# Patient Record
Sex: MALE | Race: WHITE | NOT HISPANIC OR LATINO | Employment: OTHER | URBAN - METROPOLITAN AREA
[De-identification: names, ages, dates, MRNs, and addresses within clinical notes are randomized per-mention and may not be internally consistent; named-entity substitution may affect disease eponyms.]

---

## 2023-08-09 ENCOUNTER — TELEPHONE (OUTPATIENT)
Dept: UROLOGY | Facility: AMBULATORY SURGERY CENTER | Age: 73
End: 2023-08-09

## 2023-08-09 NOTE — TELEPHONE ENCOUNTER
New Patient    What is the reason for the patient’s appointment?:  BPH w/ urinary frequency     What office location does the patient prefer?: [facility]     Have patient records been requested?:  If No, are the records showing in Epic:     PCP will be faxing in records for pt  Fax number was given       INSURANCE:   Do we accept the patient's insurance or is the patient Self-Pay?: yes    Insurance Provider: Godwin Coe [facility] PPO  Plan Type/Number: [id number]  Member ID#: [id number]      HISTORY:   Has the patient had any previous Urologist(s)?: no     Was the patient seen in the ED?: no     Has the patient had any outside testing done?: no     Does the patient have a personal history of cancer?: no

## 2023-08-12 ENCOUNTER — OFFICE VISIT (OUTPATIENT)
Dept: URGENT CARE | Facility: MEDICAL CENTER | Age: 73
End: 2023-08-12
Payer: COMMERCIAL

## 2023-08-12 ENCOUNTER — HOSPITAL ENCOUNTER (EMERGENCY)
Facility: HOSPITAL | Age: 73
Discharge: HOME | End: 2023-08-12
Attending: EMERGENCY MEDICINE
Payer: COMMERCIAL

## 2023-08-12 VITALS
HEART RATE: 98 BPM | OXYGEN SATURATION: 96 % | TEMPERATURE: 98.3 F | DIASTOLIC BLOOD PRESSURE: 91 MMHG | RESPIRATION RATE: 18 BRPM | SYSTOLIC BLOOD PRESSURE: 175 MMHG

## 2023-08-12 VITALS
HEIGHT: 71 IN | TEMPERATURE: 98.6 F | OXYGEN SATURATION: 96 % | BODY MASS INDEX: 25.2 KG/M2 | WEIGHT: 180 LBS | SYSTOLIC BLOOD PRESSURE: 140 MMHG | HEART RATE: 111 BPM | RESPIRATION RATE: 20 BRPM | DIASTOLIC BLOOD PRESSURE: 80 MMHG

## 2023-08-12 DIAGNOSIS — R00.0 TACHYCARDIA: Primary | ICD-10-CM

## 2023-08-12 DIAGNOSIS — R19.5 ABNORMAL STOOL COLOR: ICD-10-CM

## 2023-08-12 DIAGNOSIS — H61.23 BILATERAL IMPACTED CERUMEN: ICD-10-CM

## 2023-08-12 DIAGNOSIS — Z53.29 LEFT AGAINST MEDICAL ADVICE: ICD-10-CM

## 2023-08-12 LAB
ALBUMIN SERPL BCP-MCNC: 4.1 G/DL (ref 3.5–5)
ALP SERPL-CCNC: 23 U/L (ref 34–104)
ALT SERPL W P-5'-P-CCNC: 23 U/L (ref 7–52)
ANION GAP SERPL CALCULATED.3IONS-SCNC: 8 MMOL/L
AST SERPL W P-5'-P-CCNC: 15 U/L (ref 13–39)
ATRIAL RATE: 98 BPM
BASOPHILS # BLD AUTO: 0.04 THOUSANDS/ÂΜL (ref 0–0.1)
BASOPHILS NFR BLD AUTO: 0 % (ref 0–1)
BILIRUB SERPL-MCNC: 0.55 MG/DL (ref 0.2–1)
BUN SERPL-MCNC: 16 MG/DL (ref 5–25)
CALCIUM SERPL-MCNC: 8.9 MG/DL (ref 8.4–10.2)
CARDIAC TROPONIN I PNL SERPL HS: 7 NG/L
CHLORIDE SERPL-SCNC: 106 MMOL/L (ref 96–108)
CO2 SERPL-SCNC: 24 MMOL/L (ref 21–32)
CREAT SERPL-MCNC: 0.88 MG/DL (ref 0.6–1.3)
EOSINOPHIL # BLD AUTO: 0.26 THOUSAND/ÂΜL (ref 0–0.61)
EOSINOPHIL NFR BLD AUTO: 3 % (ref 0–6)
ERYTHROCYTE [DISTWIDTH] IN BLOOD BY AUTOMATED COUNT: 12.8 % (ref 11.6–15.1)
GFR SERPL CREATININE-BSD FRML MDRD: 87 ML/MIN/1.73SQ M
GLUCOSE SERPL-MCNC: 94 MG/DL (ref 65–140)
HCT VFR BLD AUTO: 47.7 % (ref 36.5–49.3)
HGB BLD-MCNC: 16.2 G/DL (ref 12–17)
IMM GRANULOCYTES # BLD AUTO: 0.04 THOUSAND/UL (ref 0–0.2)
IMM GRANULOCYTES NFR BLD AUTO: 0 % (ref 0–2)
LIPASE SERPL-CCNC: 33 U/L (ref 11–82)
LYMPHOCYTES # BLD AUTO: 1.77 THOUSANDS/ÂΜL (ref 0.6–4.47)
LYMPHOCYTES NFR BLD AUTO: 17 % (ref 14–44)
MAGNESIUM SERPL-MCNC: 1.8 MG/DL (ref 1.9–2.7)
MCH RBC QN AUTO: 31.2 PG (ref 26.8–34.3)
MCHC RBC AUTO-ENTMCNC: 34 G/DL (ref 31.4–37.4)
MCV RBC AUTO: 92 FL (ref 82–98)
MONOCYTES # BLD AUTO: 0.88 THOUSAND/ÂΜL (ref 0.17–1.22)
MONOCYTES NFR BLD AUTO: 8 % (ref 4–12)
NEUTROPHILS # BLD AUTO: 7.6 THOUSANDS/ÂΜL (ref 1.85–7.62)
NEUTS SEG NFR BLD AUTO: 72 % (ref 43–75)
NRBC BLD AUTO-RTO: 0 /100 WBCS
P AXIS: 76 DEGREES
PLATELET # BLD AUTO: 227 THOUSANDS/UL (ref 149–390)
PMV BLD AUTO: 11.1 FL (ref 8.9–12.7)
POTASSIUM SERPL-SCNC: 3.4 MMOL/L (ref 3.5–5.3)
PR INTERVAL: 144 MS
PROT SERPL-MCNC: 6 G/DL (ref 6.4–8.4)
QRS AXIS: 2 DEGREES
QRSD INTERVAL: 88 MS
QT INTERVAL: 360 MS
QTC INTERVAL: 459 MS
RBC # BLD AUTO: 5.2 MILLION/UL (ref 3.88–5.62)
SODIUM SERPL-SCNC: 138 MMOL/L (ref 135–147)
T WAVE AXIS: 40 DEGREES
TSH SERPL DL<=0.05 MIU/L-ACNC: 0.62 UIU/ML (ref 0.45–4.5)
VENTRICULAR RATE: 98 BPM
WBC # BLD AUTO: 10.59 THOUSAND/UL (ref 4.31–10.16)

## 2023-08-12 PROCEDURE — 99284 EMERGENCY DEPT VISIT MOD MDM: CPT

## 2023-08-12 PROCEDURE — 84484 ASSAY OF TROPONIN QUANT: CPT | Performed by: PHYSICIAN ASSISTANT

## 2023-08-12 PROCEDURE — 93005 ELECTROCARDIOGRAM TRACING: CPT | Performed by: PHYSICIAN ASSISTANT

## 2023-08-12 PROCEDURE — 83690 ASSAY OF LIPASE: CPT | Performed by: PHYSICIAN ASSISTANT

## 2023-08-12 PROCEDURE — S9083 URGENT CARE CENTER GLOBAL: HCPCS | Performed by: PHYSICIAN ASSISTANT

## 2023-08-12 PROCEDURE — 99285 EMERGENCY DEPT VISIT HI MDM: CPT | Performed by: PHYSICIAN ASSISTANT

## 2023-08-12 PROCEDURE — 99204 OFFICE O/P NEW MOD 45 MIN: CPT | Performed by: PHYSICIAN ASSISTANT

## 2023-08-12 PROCEDURE — 80053 COMPREHEN METABOLIC PANEL: CPT | Performed by: PHYSICIAN ASSISTANT

## 2023-08-12 PROCEDURE — 83735 ASSAY OF MAGNESIUM: CPT | Performed by: PHYSICIAN ASSISTANT

## 2023-08-12 PROCEDURE — 84443 ASSAY THYROID STIM HORMONE: CPT | Performed by: PHYSICIAN ASSISTANT

## 2023-08-12 PROCEDURE — 93005 ELECTROCARDIOGRAM TRACING: CPT

## 2023-08-12 PROCEDURE — 85025 COMPLETE CBC W/AUTO DIFF WBC: CPT | Performed by: PHYSICIAN ASSISTANT

## 2023-08-12 PROCEDURE — 36415 COLL VENOUS BLD VENIPUNCTURE: CPT | Performed by: PHYSICIAN ASSISTANT

## 2023-08-12 RX ORDER — AMLODIPINE BESYLATE 10 MG/1
1 TABLET ORAL EVERY MORNING
COMMUNITY
Start: 2023-04-23

## 2023-08-12 RX ORDER — FLUOXETINE HYDROCHLORIDE 40 MG/1
40 CAPSULE ORAL DAILY
COMMUNITY
Start: 2023-08-01

## 2023-08-12 RX ORDER — HYDROXYZINE HYDROCHLORIDE 25 MG/1
0.5 TABLET, FILM COATED ORAL 2 TIMES DAILY PRN
COMMUNITY
Start: 2023-07-07

## 2023-08-12 RX ORDER — METFORMIN HYDROCHLORIDE 500 MG/1
1 TABLET, EXTENDED RELEASE ORAL EVERY MORNING
COMMUNITY
Start: 2023-04-24

## 2023-08-12 RX ORDER — ATORVASTATIN CALCIUM 40 MG/1
1 TABLET, FILM COATED ORAL EVERY MORNING
COMMUNITY
Start: 2023-04-23

## 2023-08-12 RX ORDER — SODIUM CHLORIDE, SODIUM GLUCONATE, SODIUM ACETATE, POTASSIUM CHLORIDE, MAGNESIUM CHLORIDE, SODIUM PHOSPHATE, DIBASIC, AND POTASSIUM PHOSPHATE .53; .5; .37; .037; .03; .012; .00082 G/100ML; G/100ML; G/100ML; G/100ML; G/100ML; G/100ML; G/100ML
1000 INJECTION, SOLUTION INTRAVENOUS ONCE
Status: DISCONTINUED | OUTPATIENT
Start: 2023-08-12 | End: 2023-08-12 | Stop reason: HOSPADM

## 2023-08-12 RX ORDER — LOSARTAN POTASSIUM 50 MG/1
TABLET ORAL
COMMUNITY
Start: 2023-07-07

## 2023-08-12 RX ORDER — FENOFIBRATE 145 MG/1
1 TABLET, COATED ORAL EVERY MORNING
COMMUNITY
Start: 2023-04-23

## 2023-08-12 RX ORDER — TAMSULOSIN HYDROCHLORIDE 0.4 MG/1
1 CAPSULE ORAL EVERY MORNING
COMMUNITY
Start: 2023-07-15

## 2023-08-12 NOTE — ED PROVIDER NOTES
History  Chief Complaint   Patient presents with   • Rapid Heart Rate     Pt sent from urgent care for tachycardia, patient is not experiencing any symptoms at this time. 81year old male with PMH HTN, HLD, DM, depression/anxiety, BPH presents ambulatory for further evaluation. Pt states he went to urgent care today to "get his poop checked out". He states yesterday he had a loose stool and it appeared darker in color than usual.  He notes it was a dark brown. Denies black or bloody stools. He notes today he had a solid stool and was dark brown in color. He denies any abdominal pain. Denies N/V/D/C. He reports a good appetite. He notes he had some "gurgling" in his stomach the other day but that has improved. Denies fever, chills. Denies chest pain, SOB. He states he feels fine and is not sure why urgent care sent him here in the first place. He does not describe palpitations. He does not feel like there is a cardiac concern. Denies dizziness, lightheadedness. No syncope. No reported recent falls. Denies numbness, tingling, weakness. Notes nocturia secondary to BPH but otherwise denies any urinary complaints. Denies recent travel. Denies sick contacts. Pt states the urgent care did an EKG and sent him here. He feels this was unnecessary and does not feel he needs to be in the emergency room at this time. History provided by:  Patient and medical records   used: No    Rapid Heart Rate  Associated symptoms: no back pain, no chest pain, no cough, no dizziness, no nausea, no near-syncope, no numbness, no shortness of breath, no syncope, no vomiting and no weakness    Risk factors: no heart disease        Prior to Admission Medications   Prescriptions Last Dose Informant Patient Reported? Taking?    Aspirin Buf,CaCarb-MgCarb-MgO, 81 MG TABS   Yes No   Sig: Take 1 tablet by mouth daily   FLUoxetine (PROzac) 40 MG capsule   Yes No   Sig: Take 40 mg by mouth daily amLODIPine (NORVASC) 10 mg tablet   Yes No   Si tablet every morning   atorvastatin (LIPITOR) 40 mg tablet   Yes No   Si tablet every morning   fenofibrate (TRICOR) 145 mg tablet   Yes No   Si tablet every morning   hydrOXYzine HCL (ATARAX) 25 mg tablet   Yes No   Sig: Take 0.5 tablets by mouth 2 (two) times a day as needed   losartan (COZAAR) 50 mg tablet   Yes No   Sig: Take 1 and 1/2 (one and one-half) tablet daily. metFORMIN (GLUCOPHAGE-XR) 500 mg 24 hr tablet   Yes No   Si tablet every morning   tamsulosin (FLOMAX) 0.4 mg   Yes No   Si capsule every morning      Facility-Administered Medications: None       Past Medical History:   Diagnosis Date   • Depression    • Hypertension        History reviewed. No pertinent surgical history. History reviewed. No pertinent family history. I have reviewed and agree with the history as documented. E-Cigarette/Vaping   • E-Cigarette Use Never User      E-Cigarette/Vaping Substances     Social History     Tobacco Use   • Smoking status: Every Day     Types: Cigarettes   • Smokeless tobacco: Never   Vaping Use   • Vaping Use: Never used   Substance Use Topics   • Alcohol use: Not Currently   • Drug use: Not Currently       Review of Systems   Constitutional: Negative. Negative for chills, fatigue and fever. HENT: Negative. Negative for congestion, rhinorrhea and sore throat. Eyes: Negative. Negative for visual disturbance. Respiratory: Negative. Negative for cough, shortness of breath and wheezing. Cardiovascular: Negative for chest pain, palpitations, leg swelling, syncope and near-syncope. Gastrointestinal: Negative. Negative for abdominal pain, blood in stool, constipation, diarrhea, nausea and vomiting. Genitourinary: Negative. Negative for dysuria, flank pain, frequency and hematuria. Musculoskeletal: Negative. Negative for back pain, myalgias and neck pain. Skin: Negative. Negative for rash.    Neurological: Negative. Negative for dizziness, weakness, light-headedness, numbness and headaches. Psychiatric/Behavioral: Negative. All other systems reviewed and are negative. Physical Exam  Physical Exam  Vitals and nursing note reviewed. Constitutional:       General: He is awake. He is not in acute distress. Appearance: Normal appearance. He is well-developed and overweight. He is not toxic-appearing or diaphoretic. HENT:      Head: Normocephalic and atraumatic. Right Ear: Hearing and external ear normal.      Left Ear: Hearing and external ear normal.      Nose: Nose normal.      Mouth/Throat:      Mouth: Mucous membranes are moist.      Tongue: Tongue does not deviate from midline. Pharynx: Oropharynx is clear. Uvula midline. Eyes:      General: Lids are normal. No scleral icterus. Conjunctiva/sclera: Conjunctivae normal.      Pupils: Pupils are equal, round, and reactive to light. Neck:      Trachea: Trachea and phonation normal.   Cardiovascular:      Rate and Rhythm: Normal rate and regular rhythm. Extrasystoles are present. Pulses: Normal pulses. Radial pulses are 2+ on the right side and 2+ on the left side. Dorsalis pedis pulses are 2+ on the right side and 2+ on the left side. Posterior tibial pulses are 2+ on the right side and 2+ on the left side. Heart sounds: Normal heart sounds, S1 normal and S2 normal. No murmur heard. Pulmonary:      Effort: Pulmonary effort is normal. No tachypnea or respiratory distress. Breath sounds: Normal breath sounds. No wheezing, rhonchi or rales. Abdominal:      General: Bowel sounds are normal. There is no distension. Palpations: Abdomen is soft. Tenderness: There is no abdominal tenderness. There is no right CVA tenderness, left CVA tenderness, guarding or rebound. Musculoskeletal:      Cervical back: Normal range of motion and neck supple. Right lower leg: No edema.       Left lower leg: No edema. Skin:     General: Skin is warm and dry. Capillary Refill: Capillary refill takes less than 2 seconds. Findings: No rash. Comments: Pt has a mass/growth in supraclavicular region on the right. Neurological:      General: No focal deficit present. Mental Status: He is alert and oriented to person, place, and time. GCS: GCS eye subscore is 4. GCS verbal subscore is 5. GCS motor subscore is 6. Cranial Nerves: Cranial nerves 2-12 are intact. Sensory: Sensation is intact. Motor: Motor function is intact. Gait: Gait normal.      Comments: Pt ambulatory to exam room, steady gait. Psychiatric:         Mood and Affect: Mood is anxious. Speech: Speech normal.         Behavior: Behavior normal. Behavior is cooperative.          Vital Signs  ED Triage Vitals [08/12/23 0949]   Temperature Pulse Respirations Blood Pressure SpO2   98.3 °F (36.8 °C) 98 18 (!) 175/91 96 %      Temp Source Heart Rate Source Patient Position - Orthostatic VS BP Location FiO2 (%)   Temporal Monitor Lying Left arm --      Pain Score       No Pain           Vitals:    08/12/23 0949   BP: (!) 175/91   Pulse: 98   Patient Position - Orthostatic VS: Lying         Visual Acuity      ED Medications  Medications   multi-electrolyte (ISOLYTE-S PH 7.4) bolus 1,000 mL (has no administration in time range)       Diagnostic Studies  Results Reviewed     Procedure Component Value Units Date/Time    HS Troponin 0hr (reflex protocol) [[id number]]  (Normal) Collected: 08/12/23 1015    Lab Status: Final result Specimen: Blood from Arm, Right Updated: 08/12/23 1044     hs TnI 0hr 7 ng/L     HS Troponin I 2hr [[id number]]     Lab Status: No result Specimen: Blood     Comprehensive metabolic panel [[id number]]  (Abnormal) Collected: 08/12/23 1015    Lab Status: Final result Specimen: Blood from Arm, Right Updated: 08/12/23 1039     Sodium 138 mmol/L      Potassium 3.4 mmol/L      Chloride 106 mmol/L CO2 24 mmol/L      ANION GAP 8 mmol/L      BUN 16 mg/dL      Creatinine 0.88 mg/dL      Glucose 94 mg/dL      Calcium 8.9 mg/dL      AST 15 U/L      ALT 23 U/L      Alkaline Phosphatase 23 U/L      Total Protein 6.0 g/dL      Albumin 4.1 g/dL      Total Bilirubin 0.55 mg/dL      eGFR 87 ml/min/1.73sq m     Narrative:      National Kidney Disease Foundation guidelines for Chronic Kidney Disease (CKD):   •  Stage 1 with normal or high GFR (GFR > 90 mL/min/1.73 square meters)  •  Stage 2 Mild CKD (GFR = 60-89 mL/min/1.73 square meters)  •  Stage 3A Moderate CKD (GFR = 45-59 mL/min/1.73 square meters)  •  Stage 3B Moderate CKD (GFR = 30-44 mL/min/1.73 square meters)  •  Stage 4 Severe CKD (GFR = 15-29 mL/min/1.73 square meters)  •  Stage 5 End Stage CKD (GFR <15 mL/min/1.73 square meters)  Note: GFR calculation is accurate only with a steady state creatinine    Lipase [[id number]]  (Normal) Collected: 08/12/23 1015    Lab Status: Final result Specimen: Blood from Arm, Right Updated: 08/12/23 1039     Lipase 33 u/L     Magnesium [[id number]]  (Abnormal) Collected: 08/12/23 1015    Lab Status: Final result Specimen: Blood from Arm, Right Updated: 08/12/23 1039     Magnesium 1.8 mg/dL     CBC and differential [[id number]]  (Abnormal) Collected: 08/12/23 1015    Lab Status: Final result Specimen: Blood from Arm, Right Updated: 08/12/23 1022     WBC 10.59 Thousand/uL      RBC 5.20 Million/uL      Hemoglobin 16.2 g/dL      Hematocrit 47.7 %      MCV 92 fL      MCH 31.2 pg      MCHC 34.0 g/dL      RDW 12.8 %      MPV 11.1 fL      Platelets 681 Thousands/uL      nRBC 0 /100 WBCs      Neutrophils Relative 72 %      Immat GRANS % 0 %      Lymphocytes Relative 17 %      Monocytes Relative 8 %      Eosinophils Relative 3 %      Basophils Relative 0 %      Neutrophils Absolute 7.60 Thousands/µL      Immature Grans Absolute 0.04 Thousand/uL      Lymphocytes Absolute 1.77 Thousands/µL      Monocytes Absolute 0.88 Thousand/µL Eosinophils Absolute 0.26 Thousand/µL      Basophils Absolute 0.04 Thousands/µL     TSH, 3rd generation with Free T4 reflex [[id number]] Collected: 08/12/23 1015    Lab Status: In process Specimen: Blood from Arm, Right Updated: 08/12/23 1019                 No orders to display              Procedures  ECG 12 Lead Documentation Only    Date/Time: 8/12/2023 9:51 AM    Performed by: Cheng Bowen PA-C  Authorized by: Cheng Bowen PA-C    Indications / Diagnosis:  Tachycardia  ECG reviewed by me, the ED Provider: yes    Patient location:  ED  Previous ECG:     Comparison to cardiac monitor: Yes    Interpretation:     Interpretation: non-specific    Rate:     ECG rate:  98    ECG rate assessment: normal    Rhythm:     Rhythm: sinus rhythm    QRS:     QRS axis:  Normal    QRS intervals:  Normal  Conduction:     Conduction: normal    ST segments:     ST segments:  Non-specific  T waves:     T waves: normal    Comments:      , QRS 88, QT/QTc 360/459; no acute ischemic changes, SR with premature supraventricular complexes. ED Course  ED Course as of 08/12/23 1052   Sat Aug 12, 2023   1004 Reviewed urgent care visit from earlier today. Referred to ER due to fluctuating HR.   1024 Pt had recent visit with PCP in the office for routine follow up on 7/7/23 which was reviewed. Had outpatient labs on 7/21/23 which were also reviewed. 1024 WBC(!): 10.59  Minimally elevated, non specific   1025 Hemoglobin: 16.2  Stable, value of 14.9 on outpatient labs 7/21/23 from outside facility. 1025 Platelet Count: 896   8632 Pt indicates he does not want to stay for remainder of the testing. He reports he has other stuff to be doing today. He does not feel he should of been sent here in the first place. He declined ordered fluids. Declined rectal exam.  He indicates under no circumstances would he ever consider being admitted to the hospital.  He would like to be discharged at that time.   Only CBC has resulted at this time. Will have pt sign out against medical advice. He is aware he may return at any time if he reconsiders further testing/evaluation. He reports he will follow up with his doctor on an outpatient basis and go for any needed testing. This patient insists on leaving against medical advice, despite my recommendation to remain for ongoing evaluation and treatment. The patient appears to understand the risks of doing so. He is awake, alert and oriented. He does not appear to be under the influence. He appears capable of making his own medical decisions. We discussed worsening/deterioration of condition, undiagnosed condition, permanent disability, cardiac arrest and death as a potential consequence of leaving at this time. I have discussed the patient's plan for follow-up care and urged an immediate arrangement of follow-up. I have emphasized that the patient can always return to the emergency department at any time for further evaluation and treatment. Pt voiced understanding and had no further questions. Pt left AMA in stable condition. HEART Risk Score    Flowsheet Row Most Recent Value   Heart Score Risk Calculator    History 0 Filed at: 08/12/2023 1027   ECG 0 Filed at: 08/12/2023 1027   Age 2 Filed at: 08/12/2023 1027   Risk Factors 1 Filed at: 08/12/2023 1027   Troponin 0 Filed at: 08/12/2023 1027   HEART Score 3 Filed at: 08/12/2023 1027                        SBIRT 22yo+    Flowsheet Row Most Recent Value   Initial Alcohol Screen: US AUDIT-C     1. How often do you have a drink containing alcohol? 0 Filed at: 08/12/2023 0948   2. How many drinks containing alcohol do you have on a typical day you are drinking? 0 Filed at: 08/12/2023 0948   3a. Male UNDER 65: How often do you have five or more drinks on one occasion? 0 Filed at: 08/12/2023 0948   3b. FEMALE Any Age, or MALE 65+: How often do you have 4 or more drinks on one occassion?  0 Filed at: 08/12/2023 4135 Audit-C Score 0 Filed at: 08/12/2023 9532                    Medical Decision Making  81year old male presenting for evaluation from urgent care. Pt voices no specific complaints. It sounds like he had a change in color and consistency of stool yesterday which concerned him but then normalized today. Will obtain EKG, check labs. Pt has a soft non tender abdomen. He has no chest or respiratory complaints. He is afebrile, well appearing. BP mildly elevated but he contributes to anxiety secondary to being sent here. Work up obtained as noted above. CBC not suggestive of bleeding, stable, improved hemoglobin from recent. Prior to additional testing/evaluation, pt signed out against medical advice. He left in stable condition and was advised to see his PCP in follow up. He is aware he may return at any time for re-evaluation or reconsideration of further testing. I did advise that he should consider further imaging of the growth/mass of his right supraclavicular region but he tells me he's had this for a while and his PCP is aware. He states he will never have surgery. He voiced understanding and had no further questions. Please refer to above ER course for further details/discussion. Tachycardia: acute illness or injury  Amount and/or Complexity of Data Reviewed  External Data Reviewed: labs and notes. Labs: ordered. Decision-making details documented in ED Course. ECG/medicine tests: ordered and independent interpretation performed. Decision-making details documented in ED Course. Risk  Prescription drug management.           Disposition  Final diagnoses:   Tachycardia   Left against medical advice     Time reflects when diagnosis was documented in both MDM as applicable and the Disposition within this note     Time User Action Codes Description Comment    8/12/2023 10:32 AM Delfina Bender Add [R00.0] Tachycardia     8/12/2023 10:52 AM Delfina Kramern Add [Z53.29] Left against medical advice       ED Disposition     ED Disposition   AMA    Condition   --    Date/Time   Sat Aug 12, 2023 10:32 AM    Comment   Date: 8/12/2023  Patient: Alexx Naidu  Admitted: 8/12/2023  9:43 AM  Attending Provider: Ilia Smyth or his authorized caregiver has made the decision for the patient to leave the emergency department against the advice o f his attending physician. He or his authorized caregiver has been informed and understands the inherent risks, including death. He or his authorized caregiver has decided to accept the responsibility for this decision. Alexx Naidu and all necessary  parties have been advised that he may return for further evaluation or treatment. His condition at time of discharge was stable.   Alexx Naidu had current vital signs as follows:  BP (!) 175/91 (BP Location: Left arm)   Pulse 98   Temp 98.3 °F (36. 8 °C) (Temporal)   Resp 18            Follow-up Information     Follow up With Specialties Details Why Contact Info Additional [address] [facility], DO Internal Medicine Schedule an appointment as soon as possible for a visit   [id number] [facility],[address] [facility] Emergency Department Emergency Medicine  As needed [address] [place] [id number]  [address] [facility] Emergency Department, [address] [place] [address], [facility]          Discharge Medication List as of 8/12/2023 10:38 AM      CONTINUE these medications which have NOT CHANGED    Details   amLODIPine (NORVASC) 10 mg tablet 1 tablet every morning, Starting Sun 4/23/2023, Historical Med      Aspirin Buf,CaCarb-MgCarb-MgO, 81 MG TABS Take 1 tablet by mouth daily, Historical Med      atorvastatin (LIPITOR) 40 mg tablet 1 tablet every morning, Starting Sun 4/23/2023, Historical Med      fenofibrate (TRICOR) 145 mg tablet 1 tablet every morning, Starting Sun 4/23/2023, Historical Med FLUoxetine (PROzac) 40 MG capsule Take 40 mg by mouth daily, Starting Tue 8/1/2023, Historical Med      hydrOXYzine HCL (ATARAX) 25 mg tablet Take 0.5 tablets by mouth 2 (two) times a day as needed, Starting Fri 7/7/2023, Historical Med      losartan (COZAAR) 50 mg tablet Take 1 and 1/2 (one and one-half) tablet daily. , Historical Med      metFORMIN (GLUCOPHAGE-XR) 500 mg 24 hr tablet 1 tablet every morning, Starting Mon 4/24/2023, Historical Med      tamsulosin (FLOMAX) 0.4 mg 1 capsule every morning, Starting Sat 7/15/2023, Historical Med             No discharge procedures on file.     PDMP Review     None          ED Provider  Electronically Signed by           Ang De Jesus PA-C  08/12/23 3332

## 2023-08-12 NOTE — PROGRESS NOTES
[facility]        NAME: Darren Alvarado is a 81 y.o. male  : 1950    MRN: [id number]  DATE: 2023  TIME: 9:34 AM    Assessment and Plan   Tachycardia [R00.0]  1. Tachycardia  ECG 12 lead    Transfer to other facility      2. Abnormal stool color        3. Bilateral impacted cerumen            Rx P: 87. Denies hx/o atrial fibrillation. EKbmp. ST depression in lead 2, 3, and aVF. Regularly irregular. Possible Mobitz Type 2. No chest pain, SOB, lightheadedness. Informed patient that we will defer cerumen impaction. Discussed risks of proceeding to ED via personal vehicle including worsening of condition and with harm to self or others. Patient verbalized understanding and refused ambulance. Signed AMA. Patient Instructions       Follow up with PCP in 3-5 days. Proceed to  ER     Chief Complaint     Chief Complaint   Patient presents with   • bowel issues      States that his bowel is a different color, states that it is a very dark brown. Half  solid and half diarrhea yesterday. Today bowel was solid    • Ear Fullness     States left ear is full and sounds like a noise from a fan . Has dr werner on 23 with ent          History of Present Illness       Reports bloating x few days, "gurgling" abdominal noises, dark stool, and "half watery and half solid" x last night. Reports decreased appetite a few days prior to symptoms, which normalized yesterday. Denies abdominal pain, nausea, vomiting. States today his stool was a "dark brown" color but solid. Denies black stool or blood. Patient was not taking pepto bismol. Does not drink "a lot of water" states he drinks soda and coffee. Apt with ENT on . Ear Fullness   There is pain in the left ear. This is a new problem. There has been no fever. The patient is experiencing no pain. Associated symptoms include diarrhea (resolved).  Pertinent negatives include no abdominal pain, coughing, ear discharge, headaches, rash, rhinorrhea, sore throat or vomiting. Associated symptoms comments: Reports "fan noise". He has tried nothing for the symptoms. Review of Systems   Review of Systems   Constitutional: Negative for chills and fever. HENT: Positive for ear pain. Negative for congestion, dental problem, ear discharge, facial swelling, postnasal drip, rhinorrhea, sinus pressure, sinus pain, sneezing, sore throat and trouble swallowing. Eyes: Negative for itching. Respiratory: Negative for cough, chest tightness, shortness of breath and wheezing. Cardiovascular: Positive for palpitations (heart flutter during physical examination). Negative for chest pain. Gastrointestinal: Positive for diarrhea (resolved). Negative for abdominal pain, constipation, nausea and vomiting. Musculoskeletal: Negative for myalgias. Skin: Negative for rash. Neurological: Negative for dizziness, weakness, light-headedness and headaches. Current Medications       Current Outpatient Medications:   •  amLODIPine (NORVASC) 10 mg tablet, 1 tablet every morning, Disp: , Rfl:   •  Aspirin Buf,CaCarb-MgCarb-MgO, 81 MG TABS, Take 1 tablet by mouth daily, Disp: , Rfl:   •  atorvastatin (LIPITOR) 40 mg tablet, 1 tablet every morning, Disp: , Rfl:   •  fenofibrate (TRICOR) 145 mg tablet, 1 tablet every morning, Disp: , Rfl:   •  FLUoxetine (PROzac) 40 MG capsule, Take 40 mg by mouth daily, Disp: , Rfl:   •  hydrOXYzine HCL (ATARAX) 25 mg tablet, Take 0.5 tablets by mouth 2 (two) times a day as needed, Disp: , Rfl:   •  losartan (COZAAR) 50 mg tablet, Take 1 and 1/2 (one and one-half) tablet daily. , Disp: , Rfl:   •  metFORMIN (GLUCOPHAGE-XR) 500 mg 24 hr tablet, 1 tablet every morning, Disp: , Rfl:   •  tamsulosin (FLOMAX) 0.4 mg, 1 capsule every morning, Disp: , Rfl:     Current Allergies     Allergies as of 08/12/2023 - Reviewed 08/12/2023   Allergen Reaction Noted   • Niacin Other (See Comments) 11/26/2019            The following portions of the patient's history were reviewed and updated as appropriate: allergies, current medications, past family history, past medical history, past social history, past surgical history and problem list.     Past Medical History:   Diagnosis Date   • Depression    • Hypertension        History reviewed. No pertinent surgical history. History reviewed. No pertinent family history. Medications have been verified. Objective   /80   Pulse (!) 111   Temp 98.6 °F (37 °C)   Resp 20   Ht 5' 10.5" (1.791 m)   Wt 81.6 kg (180 lb)   SpO2 96%   BMI 25.46 kg/m²   No LMP for male patient. Physical Exam     Physical Exam  Vitals reviewed. Constitutional:       General: He is not in acute distress. Appearance: He is well-developed. He is not diaphoretic. HENT:      Head: Normocephalic. Right Ear: External ear normal. There is impacted cerumen. Left Ear: External ear normal. There is impacted cerumen. Nose: Nose normal.      Mouth/Throat:      Pharynx: No oropharyngeal exudate or posterior oropharyngeal erythema. Eyes:      Conjunctiva/sclera: Conjunctivae normal.   Cardiovascular:      Rate and Rhythm: Normal rate. Rhythm irregular. Heart sounds: No murmur heard. No friction rub. No gallop. Comments: Varying between normal heart rate and tachycardia    Pulmonary:      Effort: Pulmonary effort is normal. No respiratory distress. Breath sounds: Normal breath sounds. No wheezing, rhonchi or rales. Abdominal:      Palpations: Abdomen is soft. Tenderness: There is no abdominal tenderness. Lymphadenopathy:      Cervical: No cervical adenopathy. Skin:     General: Skin is warm. Neurological:      Mental Status: He is alert and oriented to person, place, and time. Psychiatric:         Behavior: Behavior normal.         Thought Content:  Thought content normal.         Judgment: Judgment normal.

## 2023-08-15 LAB
ATRIAL RATE: 81 BPM
ATRIAL RATE: 98 BPM
P AXIS: 63 DEGREES
P AXIS: 76 DEGREES
PR INTERVAL: 144 MS
PR INTERVAL: 160 MS
QRS AXIS: -26 DEGREES
QRS AXIS: 2 DEGREES
QRSD INTERVAL: 86 MS
QRSD INTERVAL: 88 MS
QT INTERVAL: 360 MS
QT INTERVAL: 384 MS
QTC INTERVAL: 446 MS
QTC INTERVAL: 459 MS
T WAVE AXIS: 23 DEGREES
T WAVE AXIS: 40 DEGREES
VENTRICULAR RATE: 81 BPM
VENTRICULAR RATE: 98 BPM

## 2023-08-15 PROCEDURE — 93010 ELECTROCARDIOGRAM REPORT: CPT | Performed by: INTERNAL MEDICINE

## 2023-08-16 ENCOUNTER — OFFICE VISIT (OUTPATIENT)
Dept: UROLOGY | Facility: CLINIC | Age: 73
End: 2023-08-16
Payer: COMMERCIAL

## 2023-08-16 VITALS
BODY MASS INDEX: 25.06 KG/M2 | DIASTOLIC BLOOD PRESSURE: 68 MMHG | SYSTOLIC BLOOD PRESSURE: 118 MMHG | HEIGHT: 71 IN | WEIGHT: 179 LBS

## 2023-08-16 DIAGNOSIS — K40.90 UNILATERAL INGUINAL HERNIA WITHOUT OBSTRUCTION OR GANGRENE, RECURRENCE NOT SPECIFIED: ICD-10-CM

## 2023-08-16 DIAGNOSIS — R35.1 NOCTURIA: ICD-10-CM

## 2023-08-16 DIAGNOSIS — N13.8 BPH WITH OBSTRUCTION/LOWER URINARY TRACT SYMPTOMS: Primary | ICD-10-CM

## 2023-08-16 DIAGNOSIS — N40.1 BPH WITH OBSTRUCTION/LOWER URINARY TRACT SYMPTOMS: Primary | ICD-10-CM

## 2023-08-16 PROCEDURE — 99204 OFFICE O/P NEW MOD 45 MIN: CPT | Performed by: UROLOGY

## 2023-08-16 NOTE — PROGRESS NOTES
[address] [facility]  [facility]  [address] [place] [address]  [phone]  [email or web address]      NAME: Janice Henry  AGE: 81 y.o. SEX: male  : 1950   MRN: [id number]    DATE: 2023  TIME: 11:31 AM    Assessment and Plan:    Chronic nocturia, becoming worse as of late-May be related to BPH, also he may have bladder involvement with his large left inguinal hernia. He has had the hernia for about 30 years. He has had nocturia for a long time but it has become worse as of late. He also has a smoking history and we will check a urinalysis with microscopy in the lab. We will check a kidney and bladder ultrasound with a PVR and send him the results. In the meantime I recommend taking the Flomax twice daily once before bedtime and in the morning. May give him some relief at least cut down the amount he gets up. Follow-up for reassessment in 3 to 4 months. Chief Complaint   No chief complaint on file. History of Present Illness   New patient office visit: 74-year-old man called 2023 with BPH with urinary frequency. PSA 0.99 2023. No upper tract imaging. Complains of waking up every 2 hours at night. When he wakes up he has a minimum of 2 cups of coffee, and then he drinks diet soda with meals. He has about close to a liter of diet soda per day. He stopped drinking fluids 2 hours before bedtime and has not noticed any improvement. He believes he urinates more than average during the daytime but it is not so bothersome as at night and he realizes that his fluid consumption contributes to this. He has had to get up  at night for as long as he can remember, but has increased in frequency to every 2 hours in recent times it is bothersome. He does not feel that he can get a solid night sleep. He takes the Flomax in the morning but he has not noticed any difference.   The first 2 voids at night if he voids 4 times a night, are decent flow and volume and then the last 2 are low-volume and weaker stream.  Urine is normally light in color. No gross hematuria. Creatinine 0.88 August 12, 2023. No previous  visits. The following portions of the patient's history were reviewed and updated as appropriate: allergies, current medications, past family history, past medical history, past social history, past surgical history and problem list.  Past Medical History:   Diagnosis Date   • Depression    • Hypertension      History reviewed. No pertinent surgical history. shoulder  Review of Systems   Review of Systems   Gastrointestinal: Positive for constipation. Negative for diarrhea. Feeling of need to burp, recent discoloration of stools   Genitourinary: Positive for frequency. Negative for difficulty urinating, dysuria and hematuria. Active Problem List   There is no problem list on file for this patient. Objective   /68 (BP Location: Left arm, Patient Position: Sitting, Cuff Size: Adult)   Ht 5' 10.5" (1.791 m)   Wt 81.2 kg (179 lb)   BMI 25.32 kg/m²     Physical Exam  Vitals reviewed. Constitutional:       Appearance: Normal appearance. He is normal weight. HENT:      Head: Normocephalic and atraumatic. Eyes:      Extraocular Movements: Extraocular movements intact. Pulmonary:      Effort: Pulmonary effort is normal.   Abdominal:      General: There is no distension. Palpations: Abdomen is soft. There is no mass. Tenderness: There is no abdominal tenderness. There is no right CVA tenderness, left CVA tenderness, guarding or rebound. Hernia: A hernia is present. Comments: Very large reducible left inguinal hernia, goes into the scrotum   Genitourinary:     Penis: Normal.       Testes: Normal.   Musculoskeletal:         General: Normal range of motion. Cervical back: Normal range of motion. Skin:     Coloration: Skin is not jaundiced or pale. Neurological:      General: No focal deficit present. Mental Status: He is alert and oriented to person, place, and time. Psychiatric:         Mood and Affect: Mood normal.         Behavior: Behavior normal.         Thought Content: Thought content normal.         Judgment: Judgment normal.             Current Medications     Current Outpatient Medications:   •  amLODIPine (NORVASC) 10 mg tablet, 1 tablet every morning, Disp: , Rfl:   •  Aspirin Buf,CaCarb-MgCarb-MgO, 81 MG TABS, Take 1 tablet by mouth daily, Disp: , Rfl:   •  atorvastatin (LIPITOR) 40 mg tablet, 1 tablet every morning, Disp: , Rfl:   •  fenofibrate (TRICOR) 145 mg tablet, 1 tablet every morning, Disp: , Rfl:   •  FLUoxetine (PROzac) 40 MG capsule, Take 40 mg by mouth daily, Disp: , Rfl:   •  hydrOXYzine HCL (ATARAX) 25 mg tablet, Take 0.5 tablets by mouth 2 (two) times a day as needed, Disp: , Rfl:   •  losartan (COZAAR) 50 mg tablet, Take 1 and 1/2 (one and one-half) tablet daily. , Disp: , Rfl:   •  metFORMIN (GLUCOPHAGE-XR) 500 mg 24 hr tablet, 1 tablet every morning, Disp: , Rfl:   •  tamsulosin (FLOMAX) 0.4 mg, 1 capsule every morning, Disp: , Rfl:         Nicki Rivers MD

## 2023-08-16 NOTE — LETTER
2023     Kathryn Bliss DO  [address] [facility],[address] [facility]    Patient: Cinad Shaw   YOB: 1950   Date of Visit: 2023       Dear Dr. Shyam Robles: Thank you for referring Cinda Shaw to me for evaluation. Below are my notes for this consultation. If you have questions, please do not hesitate to call me. I look forward to following your patient along with you. Sincerely,        Mario Flores MD        CC: No Recipients    Mario Flores MD  2023 11:47 AM  Incomplete  [address] [facility]  [address] [address], [address] [place] [address]  [phone]  [email or web address]      NAME: Cinda Shaw  AGE: 81 y.o. SEX: male  : 1950   MRN: [id number]    DATE: 2023  TIME: 11:31 AM    Assessment and Plan:    Chronic nocturia, becoming worse as of late-May be related to BPH, also he may have bladder involvement with his large left inguinal hernia. He has had the hernia for about 30 years. He has had nocturia for a long time but it has become worse as of late. He also has a smoking history and we will check a urinalysis with microscopy in the lab. We will check a kidney and bladder ultrasound with a PVR and send him the results. In the meantime I recommend taking the Flomax twice daily once before bedtime and in the morning. May give him some relief at least cut down the amount he gets up. Follow-up for reassessment in 3 to 4 months. Chief Complaint   No chief complaint on file. History of Present Illness   New patient office visit: 82-year-old man called 2023 with BPH with urinary frequency. PSA 0.99 2023. No upper tract imaging. Complains of waking up every 2 hours at night. When he wakes up he has a minimum of 2 cups of coffee, and then he drinks diet soda with meals. He has about close to a liter of diet soda per day.   He stopped drinking fluids 2 hours before bedtime and has not noticed any improvement. He believes he urinates more than average during the daytime but it is not so bothersome as at night and he realizes that his fluid consumption contributes to this. He has had to get up  at night for as long as he can remember, but has increased in frequency to every 2 hours in recent times it is bothersome. He does not feel that he can get a solid night sleep. He takes the Flomax in the morning but he has not noticed any difference. The first 2 voids at night if he voids 4 times a night, are decent flow and volume and then the last 2 are low-volume and weaker stream.  Urine is normally light in color. No gross hematuria. Creatinine 0.88 August 12, 2023. No previous  visits. The following portions of the patient's history were reviewed and updated as appropriate: allergies, current medications, past family history, past medical history, past social history, past surgical history and problem list.  Past Medical History:   Diagnosis Date   • Depression    • Hypertension      History reviewed. No pertinent surgical history. shoulder  Review of Systems   Review of Systems   Gastrointestinal: Positive for constipation. Negative for diarrhea. Feeling of need to burp, recent discoloration of stools   Genitourinary: Positive for frequency. Negative for difficulty urinating, dysuria and hematuria. Active Problem List   There is no problem list on file for this patient. Objective   /68 (BP Location: Left arm, Patient Position: Sitting, Cuff Size: Adult)   Ht 5' 10.5" (1.791 m)   Wt 81.2 kg (179 lb)   BMI 25.32 kg/m²     Physical Exam  Vitals reviewed. Constitutional:       Appearance: Normal appearance. He is normal weight. HENT:      Head: Normocephalic and atraumatic. Eyes:      Extraocular Movements: Extraocular movements intact. Pulmonary:      Effort: Pulmonary effort is normal.   Abdominal:      General: There is no distension. Palpations: Abdomen is soft. There is no mass. Tenderness: There is no abdominal tenderness. There is no right CVA tenderness, left CVA tenderness, guarding or rebound. Hernia: A hernia is present. Comments: Very large reducible left inguinal hernia, goes into the scrotum   Genitourinary:     Penis: Normal.       Testes: Normal.   Musculoskeletal:         General: Normal range of motion. Cervical back: Normal range of motion. Skin:     Coloration: Skin is not jaundiced or pale. Neurological:      General: No focal deficit present. Mental Status: He is alert and oriented to person, place, and time. Psychiatric:         Mood and Affect: Mood normal.         Behavior: Behavior normal.         Thought Content: Thought content normal.         Judgment: Judgment normal.             Current Medications     Current Outpatient Medications:   •  amLODIPine (NORVASC) 10 mg tablet, 1 tablet every morning, Disp: , Rfl:   •  Aspirin Buf,CaCarb-MgCarb-MgO, 81 MG TABS, Take 1 tablet by mouth daily, Disp: , Rfl:   •  atorvastatin (LIPITOR) 40 mg tablet, 1 tablet every morning, Disp: , Rfl:   •  fenofibrate (TRICOR) 145 mg tablet, 1 tablet every morning, Disp: , Rfl:   •  FLUoxetine (PROzac) 40 MG capsule, Take 40 mg by mouth daily, Disp: , Rfl:   •  hydrOXYzine HCL (ATARAX) 25 mg tablet, Take 0.5 tablets by mouth 2 (two) times a day as needed, Disp: , Rfl:   •  losartan (COZAAR) 50 mg tablet, Take 1 and 1/2 (one and one-half) tablet daily. , Disp: , Rfl:   •  metFORMIN (GLUCOPHAGE-XR) 500 mg 24 hr tablet, 1 tablet every morning, Disp: , Rfl:   •  tamsulosin (FLOMAX) 0.4 mg, 1 capsule every morning, Disp: , Rfl:         MD Tommie Javed MD  8/16/2023 11:28 AM  Sign when Signing Visit  [address] [facility]  [address] [facility] [address] [facility], [address] [place] [address]  [phone]  [email or web address]      NAME: Milly Senior  AGE: 81 y.o.  SEX: male  : 1950   MRN: [id number]    DATE: 2023  TIME: 11:25 AM    Assessment and Plan               Chief Complaint   No chief complaint on file. History of Present Illness   New patient office visit: 61-year-old man called 2023 with BPH with urinary frequency. PSA 0.99 2023. No upper tract imaging. The following portions of the patient's history were reviewed and updated as appropriate: allergies, current medications, past family history, past medical history, past social history, past surgical history and problem list.  Past Medical History:   Diagnosis Date   • Depression    • Hypertension      History reviewed. No pertinent surgical history. shoulder  Review of Systems   Review of Systems    Active Problem List   There is no problem list on file for this patient. Objective   /68 (BP Location: Left arm, Patient Position: Sitting, Cuff Size: Adult)   Ht 5' 10.5" (1.791 m)   Wt 81.2 kg (179 lb)   BMI 25.32 kg/m²     Physical Exam        Current Medications     Current Outpatient Medications:   •  amLODIPine (NORVASC) 10 mg tablet, 1 tablet every morning, Disp: , Rfl:   •  Aspirin Buf,CaCarb-MgCarb-MgO, 81 MG TABS, Take 1 tablet by mouth daily, Disp: , Rfl:   •  atorvastatin (LIPITOR) 40 mg tablet, 1 tablet every morning, Disp: , Rfl:   •  fenofibrate (TRICOR) 145 mg tablet, 1 tablet every morning, Disp: , Rfl:   •  FLUoxetine (PROzac) 40 MG capsule, Take 40 mg by mouth daily, Disp: , Rfl:   •  hydrOXYzine HCL (ATARAX) 25 mg tablet, Take 0.5 tablets by mouth 2 (two) times a day as needed, Disp: , Rfl:   •  losartan (COZAAR) 50 mg tablet, Take 1 and 1/2 (one and one-half) tablet daily. , Disp: , Rfl:   •  metFORMIN (GLUCOPHAGE-XR) 500 mg 24 hr tablet, 1 tablet every morning, Disp: , Rfl:   •  tamsulosin (FLOMAX) 0.4 mg, 1 capsule every morning, Disp: , Rfl:         Jack Shaikh MD

## 2023-08-16 NOTE — PATIENT INSTRUCTIONS
Have the kidney and bladder ultrasound done and I will send you the results along with the urinalysis. Take the Flomax in the morning and at nighttime. Let me know if you need more Flomax called in.

## 2023-08-31 ENCOUNTER — APPOINTMENT (OUTPATIENT)
Dept: LAB | Facility: HOSPITAL | Age: 73
End: 2023-08-31
Payer: COMMERCIAL

## 2023-08-31 ENCOUNTER — HOSPITAL ENCOUNTER (OUTPATIENT)
Dept: ULTRASOUND IMAGING | Facility: HOSPITAL | Age: 73
Discharge: HOME | End: 2023-08-31
Attending: UROLOGY
Payer: COMMERCIAL

## 2023-08-31 DIAGNOSIS — N13.8 BPH WITH OBSTRUCTION/LOWER URINARY TRACT SYMPTOMS: ICD-10-CM

## 2023-08-31 DIAGNOSIS — R35.1 NOCTURIA: ICD-10-CM

## 2023-08-31 DIAGNOSIS — N40.1 BPH WITH OBSTRUCTION/LOWER URINARY TRACT SYMPTOMS: ICD-10-CM

## 2023-08-31 LAB
BACTERIA UR QL AUTO: ABNORMAL /HPF
BILIRUB UR QL STRIP: NEGATIVE
CLARITY UR: CLEAR
COLOR UR: YELLOW
GLUCOSE UR STRIP-MCNC: NEGATIVE MG/DL
HGB UR QL STRIP.AUTO: NEGATIVE
KETONES UR STRIP-MCNC: NEGATIVE MG/DL
LEUKOCYTE ESTERASE UR QL STRIP: NEGATIVE
NITRITE UR QL STRIP: NEGATIVE
NON-SQ EPI CELLS URNS QL MICRO: ABNORMAL /HPF
PH UR STRIP.AUTO: 6 [PH]
PROT UR STRIP-MCNC: NEGATIVE MG/DL
RBC #/AREA URNS AUTO: ABNORMAL /HPF
SP GR UR STRIP.AUTO: <=1.005 (ref 1–1.03)
UROBILINOGEN UR QL STRIP.AUTO: 0.2 E.U./DL
WBC #/AREA URNS AUTO: ABNORMAL /HPF

## 2023-08-31 PROCEDURE — 76770 US EXAM ABDO BACK WALL COMP: CPT

## 2023-08-31 PROCEDURE — 81001 URINALYSIS AUTO W/SCOPE: CPT

## 2023-09-08 ENCOUNTER — TELEPHONE (OUTPATIENT)
Age: 73
End: 2023-09-08

## 2023-09-08 NOTE — TELEPHONE ENCOUNTER
I reviewed US finding with following impression:    IMPRESSION:     1. No hydronephrosis.     2. Echogenic foci within the bilateral kidneys which may represent small nonobstructing calculi. The largest is on the left measuring 3 mm.     3. Mild post void residual volume. Measured post void bladder volume is 65.2 cc.     4. Prostatomegaly.     5. Bladder calculus measuring 2.6 cm.     6. Small bilateral renal cysts. These include a septated cyst measuring 1.2 cm within the left kidney. Follow-up with renal ultrasound is recommended in 6 months to confirm stability. Recommendations:    No acute findings on Renal US. Patient has bilateral renal cyst which includes a septated cyst measuring 1.2 cm in the left kidney. This can be reassessed on repeat imaging in 6 months. In regards to findings of bilateral nephrolithiasis and large 2.6 cm bladder calculi, this can be discussed at his follow-up appointment in November. PVR is within expected range.       Anil Gonzalez

## 2023-09-08 NOTE — TELEPHONE ENCOUNTER
Contacted and spoke with the patient to review US results. Patient understands he has bilateral cysts and kidney stones. Also has bladder stones. Discussed with the patient he has an appointment in November with Dr Lorenzo Cuellar and further discuss results at that time.   Patient verbalized understanding

## 2023-09-21 ENCOUNTER — OFFICE VISIT (OUTPATIENT)
Dept: OTOLARYNGOLOGY | Facility: CLINIC | Age: 73
End: 2023-09-21
Payer: COMMERCIAL

## 2023-09-21 VITALS
TEMPERATURE: 98 F | DIASTOLIC BLOOD PRESSURE: 60 MMHG | OXYGEN SATURATION: 97 % | SYSTOLIC BLOOD PRESSURE: 118 MMHG | BODY MASS INDEX: 25.06 KG/M2 | WEIGHT: 179 LBS | HEART RATE: 93 BPM | HEIGHT: 71 IN

## 2023-09-21 DIAGNOSIS — J34.2 NASAL SEPTAL DEVIATION: ICD-10-CM

## 2023-09-21 DIAGNOSIS — J31.0 CHRONIC RHINITIS: ICD-10-CM

## 2023-09-21 DIAGNOSIS — H61.23 BILATERAL IMPACTED CERUMEN: Primary | ICD-10-CM

## 2023-09-21 DIAGNOSIS — J34.3 HYPERTROPHY OF BOTH INFERIOR NASAL TURBINATES: ICD-10-CM

## 2023-09-21 DIAGNOSIS — R09.82 PND (POST-NASAL DRIP): ICD-10-CM

## 2023-09-21 DIAGNOSIS — Z72.0 TOBACCO USE: ICD-10-CM

## 2023-09-21 DIAGNOSIS — M79.89 SOFT TISSUE MASS: ICD-10-CM

## 2023-09-21 PROCEDURE — 31575 DIAGNOSTIC LARYNGOSCOPY: CPT | Performed by: PHYSICIAN ASSISTANT

## 2023-09-21 PROCEDURE — 99204 OFFICE O/P NEW MOD 45 MIN: CPT | Performed by: PHYSICIAN ASSISTANT

## 2023-09-21 RX ORDER — MECOBALAMIN 5000 MCG
1 TABLET,DISINTEGRATING ORAL
COMMUNITY
Start: 2023-08-28

## 2023-09-21 RX ORDER — FLUTICASONE PROPIONATE 50 MCG
2 SPRAY, SUSPENSION (ML) NASAL DAILY
Qty: 16 G | Refills: 5 | Status: SHIPPED | OUTPATIENT
Start: 2023-09-21

## 2023-09-21 RX ORDER — AZELASTINE 1 MG/ML
2 SPRAY, METERED NASAL DAILY
Qty: 30 ML | Refills: 5 | Status: SHIPPED | OUTPATIENT
Start: 2023-09-21

## 2023-09-21 RX ORDER — FLUOXETINE HYDROCHLORIDE 20 MG/1
CAPSULE ORAL
COMMUNITY
Start: 2023-09-21

## 2023-09-21 NOTE — PROGRESS NOTES
[facility] Otolaryngology New Patient visit      Erica Giles is a 72 y.o. male who presents with a chief complaint of ears    Independent historian: none      Pertinent elements of the history:  Cerumen impaction  PCP unable to clean     Tried 10 drops of Debrox, but didn't hear fizzing  Used  Peroxide in the past     Nasal congestion  PND   Sensation of mucus in the throat   Tries to expectorate, and will bring up white mucus     Uses navage when his nose is clogged   Removes white mucus   Feels better after using    No nasal sprays   No rhinorrhea    Will be seeing GI  Feels he has to belch     Current smoker x 60 years   Refuses CT lung       Review of any relevant imaging: images from any scan reviewed personally  Scans:   Labs:   Notes:     Review of Systems:  As above    PMHx:  Past Medical History:   Diagnosis Date   • Depression    • Hypertension         FAMHx:  History reviewed. No pertinent family history. SOCHx:  Social History     Socioeconomic History   • Marital status: Single     Spouse name: None   • Number of children: None   • Years of education: None   • Highest education level: None   Occupational History   • None   Tobacco Use   • Smoking status: Every Day     Types: Cigarettes   • Smokeless tobacco: Never   Vaping Use   • Vaping Use: Never used   Substance and Sexual Activity   • Alcohol use: Not Currently   • Drug use: Not Currently   • Sexual activity: Not Currently   Other Topics Concern   • None   Social History Narrative   • None     Social Determinants of Health     Financial Resource Strain: Not on file   Food Insecurity: Not on file   Transportation Needs: Not on file   Physical Activity: Not on file   Stress: Not on file   Social Connections: Not on file   Intimate Partner Violence: Not on file   Housing Stability: Not on file       Allergies:   Allergies   Allergen Reactions   • Niacin Other (See Comments)        MEDS:  Reviewed      Physical exam: (abnormal findings appear in bold and supercede any conflicting normal findings listed below)    /60 (BP Location: Left arm, Cuff Size: Standard)   Pulse 93   Temp 98 °F (36.7 °C) (Temporal)   Ht 5' 10.5" (1.791 m)   Wt 81.2 kg (179 lb)   SpO2 97%   BMI 25.32 kg/m²     Constitutional:  Well developed, well nourished and groomed, in no acute distress. Eyes:  Extra-ocular movements intact, pupils equally round and reactive to light and accommodation, the lids and conjunctivae are normal in appearance. Head: Atraumatic, normocephalic, no visible scalp lesions, bony palpation unremarkable without stepoffs, parotid and submandibular salivary glands non-tender to palpation and without masses bilaterally. Ears:  Auricles normal in appearance bilaterally, mastoid prominence non-tender, external auditory canals clear bilaterally, tympanic membranes intact bilaterally without evidence of middle ear effusion or masses, normal appearing ossicles. B/l cerumen impaction. See proc. Nose/Sinuses:  External appearance unremarkable, no maxillary or frontal sinus tenderness to palpation bilaterally. Anterior rhinoscopy demonstrates pink mucosa. No polyps or other masses identified. Turbinates are non-edematous. No evidence of purulent drainage. Turbinate hypertrophy, DNS    Oral Cavity:  Moist mucus membranes, gums and dentition unremarkable, no oral mucosal masses or lesions, floor of mouth soft, tongue mobile without masses or lesions. Oropharynx:  Base of tongue soft and without masses, tonsils bilaterally unremarkable, soft palate mucosa unremarkable. Neck:  No visible or palpable cervical lesions or lymphadenopathy, thyroid gland is normal in size and symmetry and without masses, normal laryngeal elevation with swallowing. Large, ~10 cm supraclavicular soft tissue mass cw lipoma. Smaller mass of similar characteristics, mobile.  approx 3.5 cm    Cardiovascular:  Normal rate and rhythm, no palpable thrills, no jugulovenous distension observed. Respiratory:  Normal respiratory effort without evidence of retractions or use of accessory muscles. Integument:  Normal appearing without observed masses or lesions. Neurologic:  Cranial nerves II-XII intact bilaterally. Psychiatric:  Alert and oriented to time, place and person, normal affect. Procedure:   Laryngoscopy          Performed by Leticia Mcgovern PA-C      Authorized by Leticia Mcgovern PA-C        Consent: Verbal consent obtained. Consent given by: patient  Patient understanding: patient states understanding of the procedure being performed        Local anesthesia used: yes      Anesthesia    Local anesthesia used: yes  Local Anesthetic: topical anesthetic      Sedation    Patient sedated: no           Culture: no   Procedure Details    Procedure Notes:  Nasopharynx unremarkable, with normal eustachian tube orifices and normal Fossa of Rosenmuller bilaterally. Posterior nasopharyngeal and oropharyngeal walls normal. Oropharyngeal mucosa moist, no masses or lesions. Tongue base normal without masses or lesions. Vallecula and pyriform sinuses clear, without pooling of secretions. Epiglottis, aryepiglottic folds and remainder of supraglottis well-appearing. True vocal folds mobile, without masses or lesions, normal glottic chink. Other findings: significant nasal congestion. Difficult to scope, but able to complete examination. There is opaque mucus in the nasopharynx. Larynx is as above. Patient tolerance: Patient tolerated the procedure well with no immediate complications       Procedure: Cerumen debridement b/ lEAC     Indications: Cerumen impaction b/l  EAC     Procedure in detail: After informed verbal consent was obtained the ear was visualized using procedural otoscope. Affected ear was debrided of cerumen using cerumen loop, suction, and alligator forceps. The findings below were seen. The patient tolerated the procedure well.      FINDINGS: Cerumen impaction removed without difficulty. Assessment:  1. Bilateral impacted cerumen        2. Chronic rhinitis  fluticasone (FLONASE) 50 mcg/act nasal spray    azelastine (ASTELIN) 0.1 % nasal spray      3. PND (post-nasal drip)        4. Hypertrophy of both inferior nasal turbinates  fluticasone (FLONASE) 50 mcg/act nasal spray    azelastine (ASTELIN) 0.1 % nasal spray      5. Nasal septal deviation  fluticasone (FLONASE) 50 mcg/act nasal spray    azelastine (ASTELIN) 0.1 % nasal spray      6. Soft tissue mass        7. Tobacco use            Plan:  1. Silverio Fu is a 72 y.o. male with acute and chronic problems as above who presents for evaluation of cerumen impaction and nasal congestion. Bilateral impacted cerumen   Bilateral cerumen impaction removed without difficulty. Ears well-appearing. Oil and cerumen within the EAC is a natural process. Recommend against using Q-tips. May use 3-4 drops of Debrox or hydrogen peroxide PRN. Chronic rhinitis    He has significant congestion on scope today. Septal deviation with large turbinates. Opaque mucus in the nasopharynx. Larynx is clear. Discussed trial of Flonase + Astelin. Continue navage. He is not interested in any imaging or procedural options. Soft tissue mass   He also has two right supraclavicular soft tissue masses that are soft and mobile on exam. Consistent with lipomas. We did discuss metastasis to malignancies that can emerge here, especially in smokers. Low suspicion of this given the characteristics of mobility and soft nature. Again, he's not interested in imaging, but it was recommended regardless. Continue to monitor with PCP. Tobacco use   4. Smoking cessation benefits. Risks of continued smoking. Alternatives to quit. Discussed. Follow up 3 months. ** Please Note: Portions of the record may have been created with voice recognition software.  Occasional wrong word or "sound a like" substitutions may have occurred due to the inherent limitations of voice recognition software. There may also be notations and random deletions of words or characters from malfunctioning software. Read the chart carefully and recognize, using context, where substitutions/deletions have occurred. **

## 2023-09-21 NOTE — PROGRESS NOTES
Review of Systems   Constitutional: Negative. HENT: Positive for congestion and postnasal drip. Eyes: Negative. Respiratory: Negative. Cardiovascular: Negative. Gastrointestinal: Negative. Endocrine: Negative. Genitourinary: Negative. Musculoskeletal: Negative. Skin: Negative. Allergic/Immunologic: Negative. Neurological: Negative. Hematological: Negative. Psychiatric/Behavioral: Negative.

## 2023-09-22 ENCOUNTER — TELEPHONE (OUTPATIENT)
Dept: UROLOGY | Facility: CLINIC | Age: 73
End: 2023-09-22

## 2023-09-22 NOTE — TELEPHONE ENCOUNTER
Inform Pt that he has a bladder stone. Expain to the Pt that Dr. Edi King wan to know  that if it becomes particularly bothersome, Dr. Edi King can try to set him up for cystoscopy and laser lithotripsy of the bladder stone in the operating room sooner if Pt wishes -if he does wish for it to be sooner, I will need to place a case request now so we can reserve a spot before I see him on November 29. Pt confirm that nothing at this point its bother him but if started he will make a call and let us know.

## 2023-09-22 NOTE — TELEPHONE ENCOUNTER
----- Message from Isaac Royal MD sent at 9/21/2023  3:48 PM EDT -----  Patient has been informed that he has a bladder stone-please let him know that if it becomes particularly bothersome, we can try to set him up for cystoscopy and laser lithotripsy of the bladder stone in the operating room sooner if he wishes -if he   does wish for it to be sooner, I will need to place a case request now so we can reserve a spot before I see him on November 29.

## 2023-11-30 ENCOUNTER — OFFICE VISIT (OUTPATIENT)
Dept: OTOLARYNGOLOGY | Facility: CLINIC | Age: 73
End: 2023-11-30
Payer: COMMERCIAL

## 2023-11-30 VITALS
WEIGHT: 184 LBS | HEART RATE: 75 BPM | SYSTOLIC BLOOD PRESSURE: 140 MMHG | HEIGHT: 71 IN | DIASTOLIC BLOOD PRESSURE: 60 MMHG | BODY MASS INDEX: 25.76 KG/M2 | TEMPERATURE: 97.9 F | RESPIRATION RATE: 16 BRPM | OXYGEN SATURATION: 96 %

## 2023-11-30 DIAGNOSIS — M79.89 SOFT TISSUE MASS: ICD-10-CM

## 2023-11-30 DIAGNOSIS — J34.3 HYPERTROPHY OF BOTH INFERIOR NASAL TURBINATES: ICD-10-CM

## 2023-11-30 DIAGNOSIS — H91.90 HEARING LOSS, UNSPECIFIED HEARING LOSS TYPE, UNSPECIFIED LATERALITY: Primary | ICD-10-CM

## 2023-11-30 DIAGNOSIS — Z72.0 TOBACCO USE: ICD-10-CM

## 2023-11-30 DIAGNOSIS — J31.0 CHRONIC RHINITIS: ICD-10-CM

## 2023-11-30 DIAGNOSIS — R09.82 PND (POST-NASAL DRIP): ICD-10-CM

## 2023-11-30 PROCEDURE — 99213 OFFICE O/P EST LOW 20 MIN: CPT | Performed by: PHYSICIAN ASSISTANT

## 2023-11-30 NOTE — PROGRESS NOTES
Review of Systems   HENT:  Positive for hearing loss. Wax build up on both ears   All other systems reviewed and are negative.

## 2023-11-30 NOTE — PROGRESS NOTES
[facility] Otolaryngology New Patient visit      Carlos Dejesus is a 72 y.o. male who presents with a chief complaint of ears    Independent historian: none      Pertinent elements of the history:  Gradual hearing loss  Used a Q tip, removed a lot of wax     Nasal congestion  Sensation of mucus in the throat   Feels better and stopped stopped the nasal sprays (Flonase, Astelin)    Uses navage when his nose is clogged, helps      Hx of R supraclavicular soft tissue mass x 2  PCP monitoring  He is not interested in imaging or surgery    Current smoker x 60 years   Refuses CT lung       Review of any relevant imaging: images from any scan reviewed personally  Scans:   Labs:   Notes:     Review of Systems:  As above    PMHx:  Past Medical History:   Diagnosis Date    Dental disease years    Depression     Dizziness weeks    Ear problems years    HL (hearing loss) months    Hypertension     Nasal congestion months    Sleep difficulties months    Tinnitus months        FAMHx:  Family History   Problem Relation Age of Onset    Diabetes Mother     Hypertension Mother     Hypertension Father        SOCHx:  Social History     Socioeconomic History    Marital status: Single     Spouse name: None    Number of children: None    Years of education: None    Highest education level: None   Occupational History    None   Tobacco Use    Smoking status: Every Day     Packs/day: 1.00     Years: 35.00     Total pack years: 35.00     Types: Cigarettes    Smokeless tobacco: Never   Vaping Use    Vaping Use: Never used   Substance and Sexual Activity    Alcohol use: Not Currently    Drug use: Not Currently    Sexual activity: Not Currently   Other Topics Concern    None   Social History Narrative    None     Social Determinants of Health     Financial Resource Strain: Not on file   Food Insecurity: Not on file   Transportation Needs: Not on file   Physical Activity: Not on file   Stress: Not on file   Social Connections: Not on file Intimate Partner Violence: Not on file   Housing Stability: Not on file       Allergies: Allergies   Allergen Reactions    Niacin Other (See Comments)        MEDS:  Reviewed      Physical exam: (abnormal findings appear in bold and supercede any conflicting normal findings listed below)    /60   Pulse 75   Temp 97.9 °F (36.6 °C) (Temporal)   Resp 16   Ht 5' 10.5" (1.791 m)   Wt 83.5 kg (184 lb)   SpO2 96%   BMI 26.03 kg/m²     Constitutional:  Well developed, well nourished and groomed, in no acute distress. Eyes:  Extra-ocular movements intact, pupils equally round and reactive to light and accommodation, the lids and conjunctivae are normal in appearance. Head: Atraumatic, normocephalic, no visible scalp lesions, bony palpation unremarkable without stepoffs, parotid and submandibular salivary glands non-tender to palpation and without masses bilaterally. Ears:  Auricles normal in appearance bilaterally, mastoid prominence non-tender, external auditory canals clear bilaterally, tympanic membranes intact bilaterally without evidence of middle ear effusion or masses, normal appearing ossicles. Nose/Sinuses:  External appearance unremarkable, no maxillary or frontal sinus tenderness to palpation bilaterally. Anterior rhinoscopy demonstrates pink mucosa. No polyps or other masses identified. Turbinates are non-edematous. No evidence of purulent drainage. Turbinate hypertrophy, improved from last visit. DNS    Oral Cavity:  Moist mucus membranes, gums and dentition unremarkable, no oral mucosal masses or lesions, floor of mouth soft, tongue mobile without masses or lesions. Oropharynx:  Base of tongue soft and without masses, tonsils bilaterally unremarkable, soft palate mucosa unremarkable. Neck:  No visible or palpable cervical lesions or lymphadenopathy, thyroid gland is normal in size and symmetry and without masses, normal laryngeal elevation with swallowing.  Large, ~10 cm supraclavicular soft tissue mass cw lipoma. Smaller mass of similar characteristics, mobile. approx 3.5 cm. Stable in size and consistency. Cardiovascular:  Normal rate and rhythm, no palpable thrills, no jugulovenous distension observed. Respiratory:  Normal respiratory effort without evidence of retractions or use of accessory muscles. Integument:  Normal appearing without observed masses or lesions. Neurologic:  Cranial nerves II-XII intact bilaterally. Psychiatric:  Alert and oriented to time, place and person, normal affect. Procedure:      Assessment:  1. Hearing loss, unspecified hearing loss type, unspecified laterality  Ambulatory referral to Audiology      2. Chronic rhinitis        3. PND (post-nasal drip)        4. Hypertrophy of both inferior nasal turbinates        5. Soft tissue mass        6. Tobacco use              Plan:  1. Haider Grider is a 72 y.o. male with acute and chronic problems as above who presents for re-evaluation of ears and nasal congestion. Hearing loss  Ears feel blocked. No cerumen impaction today. Suggest we check his hearing today. He would like to return for audiogram since he has other obligations today. Chronic rhinitis    He had improvement in his nasal symptoms with the Flonase and Astelin. He discontinued and still feeling well. May re-introduce if symptoms return. Discussed he would have a better response if taking daily. Soft tissue mass   Stable two right supraclavicular soft tissue masses. Soft and mobile on exam. Consistent with lipomas. No changes compared to last visit. We did discuss metastases that can emerge here, especially in smokers. Low suspicion of this given the characteristics of mobility and soft nature. He has had these for years and PCP is aware. Interested in neither imaging nor surgery. Continue to monitor with PCP. Tobacco use   Smoking cessation benefits. Risks of continued smoking.  Alternatives to quit discussed. Follow up 6 months              ** Please Note: Portions of the record may have been created with voice recognition software. Occasional wrong word or "sound a like" substitutions may have occurred due to the inherent limitations of voice recognition software. There may also be notations and random deletions of words or characters from malfunctioning software. Read the chart carefully and recognize, using context, where substitutions/deletions have occurred. **

## 2024-05-30 ENCOUNTER — OFFICE VISIT (OUTPATIENT)
Dept: OTOLARYNGOLOGY | Facility: CLINIC | Age: 74
End: 2024-05-30
Payer: COMMERCIAL

## 2024-05-30 ENCOUNTER — OFFICE VISIT (OUTPATIENT)
Dept: AUDIOLOGY | Facility: CLINIC | Age: 74
End: 2024-05-30
Payer: COMMERCIAL

## 2024-05-30 VITALS
HEIGHT: 71 IN | DIASTOLIC BLOOD PRESSURE: 80 MMHG | SYSTOLIC BLOOD PRESSURE: 170 MMHG | WEIGHT: 195.4 LBS | BODY MASS INDEX: 27.35 KG/M2 | OXYGEN SATURATION: 98 % | HEART RATE: 80 BPM | TEMPERATURE: 98.1 F

## 2024-05-30 DIAGNOSIS — H90.3 SENSORINEURAL HEARING LOSS (SNHL) OF BOTH EARS: ICD-10-CM

## 2024-05-30 DIAGNOSIS — H93.8X1 SENSATION OF PLUGGED EAR ON RIGHT SIDE: Primary | ICD-10-CM

## 2024-05-30 DIAGNOSIS — H61.21 IMPACTED CERUMEN OF RIGHT EAR: ICD-10-CM

## 2024-05-30 DIAGNOSIS — H90.3 SENSORY HEARING LOSS, BILATERAL: Primary | ICD-10-CM

## 2024-05-30 PROCEDURE — 92557 COMPREHENSIVE HEARING TEST: CPT | Performed by: AUDIOLOGIST-HEARING AID FITTER

## 2024-05-30 PROCEDURE — 92567 TYMPANOMETRY: CPT | Performed by: AUDIOLOGIST-HEARING AID FITTER

## 2024-05-30 PROCEDURE — 69210 REMOVE IMPACTED EAR WAX UNI: CPT | Performed by: PHYSICIAN ASSISTANT

## 2024-05-30 PROCEDURE — 99213 OFFICE O/P EST LOW 20 MIN: CPT | Performed by: PHYSICIAN ASSISTANT

## 2024-05-30 NOTE — PROGRESS NOTES
[facility] Otolaryngology New Patient visit      Geo Cates is a 73 y.o. male who presents with a chief complaint of ears    Independent historian: none      Pertinent elements of the history:  Here for hearing test   Gradual hearing loss  Wax sometimes falls from his ear    No otalgia  No otorrhea  No tinnitus    Nasal congestion is controlled.    Hx of R supraclavicular soft tissue mass x 2  Scheduled to see [facility] for this.    Review of any relevant imaging: images from any scan reviewed personally  Scans:   Labs:   Notes:     Review of Systems:  As above    PMHx:  Past Medical History:   Diagnosis Date    Dental disease years    Depression     Dizziness weeks    Ear problems years    HL (hearing loss) months    Hypertension     Nasal congestion months    Sleep difficulties months    Tinnitus months        FAMHx:  Family History   Problem Relation Age of Onset    Diabetes Mother     Hypertension Mother     Hypertension Father        SOCHx:  Social History     Socioeconomic History    Marital status: Single     Spouse name: Not on file    Number of children: Not on file    Years of education: Not on file    Highest education level: Not on file   Occupational History    Not on file   Tobacco Use    Smoking status: Every Day     Current packs/day: 1.00     Average packs/day: 1 pack/day for 35.0 years (35.0 ttl pk-yrs)     Types: Cigarettes    Smokeless tobacco: Never   Vaping Use    Vaping status: Never Used   Substance and Sexual Activity    Alcohol use: Not Currently    Drug use: Not Currently    Sexual activity: Not Currently   Other Topics Concern    Not on file   Social History Narrative    Not on file     Social Determinants of Health     Financial Resource Strain: Not on file   Food Insecurity: No Food Insecurity (8/23/2022)    Received from [facility]    Hunger Vital Sign     Worried About Running Out of Food in the Last Year: Never true     Ran Out of Food in the Last Year: Never true  "  Transportation Needs: Not on file   Physical Activity: Not on file   Stress: Not on file   Social Connections: Not on file   Intimate Partner Violence: Not on file   Housing Stability: Not on file       Allergies:  Allergies   Allergen Reactions    Niacin Other (See Comments)        MEDS:  Reviewed      Physical exam: (abnormal findings appear in bold and supercede any conflicting normal findings listed below)    /80 (BP Location: Left arm, Patient Position: Sitting, Cuff Size: Standard)   Pulse 80   Temp 98.1 °F (36.7 °C)   Ht 5' 10.5\" (1.791 m)   Wt 88.6 kg (195 lb 6.4 oz)   SpO2 98%   BMI 27.64 kg/m²     Constitutional:  Well developed, well nourished and groomed, in no acute distress.     Eyes:  Extra-ocular movements intact, pupils equally round and reactive to light and accommodation, the lids and conjunctivae are normal in appearance.    Head: Atraumatic, normocephalic, no visible scalp lesions, bony palpation unremarkable without stepoffs, parotid and submandibular salivary glands non-tender to palpation and without masses bilaterally.     Ears:  Auricles normal in appearance bilaterally, mastoid prominence non-tender, external auditory canals clear bilaterally, tympanic membranes intact bilaterally without evidence of middle ear effusion or masses, normal appearing ossicles.  R cerumen impaction. See proc.    Nose/Sinuses:  External appearance unremarkable, no maxillary or frontal sinus tenderness to palpation bilaterally. Anterior rhinoscopy demonstrates pink mucosa. No polyps or other masses identified. Turbinates are non-edematous. No evidence of purulent drainage. Turbinate hypertrophy, improved from last visit. DNS    Oral Cavity:  Moist mucus membranes, gums and dentition unremarkable, no oral mucosal masses or lesions, floor of mouth soft, tongue mobile without masses or lesions.     Oropharynx:  Base of tongue soft and without masses, tonsils bilaterally unremarkable, soft palate " mucosa unremarkable.     Neck:  No visible or palpable cervical lesions or lymphadenopathy, thyroid gland is normal in size and symmetry and without masses, normal laryngeal elevation with swallowing. Large, ~10 cm supraclavicular soft tissue mass cw lipoma. Smaller mass of similar characteristics, mobile. approx 3.5 cm. Stable in size and consistency.     Cardiovascular:  Normal rate and rhythm, no palpable thrills, no jugulovenous distension observed.  Respiratory:  Normal respiratory effort without evidence of retractions or use of accessory muscles.  Integument:  Normal appearing without observed masses or lesions.  Neurologic:  Cranial nerves II-XII intact bilaterally.  Psychiatric:  Alert and oriented to time, place and person, normal affect.      Procedure:  Procedure: Cerumen debridement right EAC     Indications: Cerumen impaction right EAC     Procedure in detail: After informed verbal consent was obtained the ear was visualized using procedural otoscope. Affected ear was debrided of cerumen using cerumen loop, suction, and alligator forceps. The findings below were seen. The patient tolerated the procedure well.     FINDINGS: Cerumen impaction removed without difficulty.      Audiometry:  Audiogram ordered and reviewed with patient and audiologist. Normal hearing in the low to mid frequencies. Mild sloping to moderately severe high frequency SNHL. Word recognition 100% at 65 dB bilaterally. Tympanograms type A bilaterally.        Assessment:  1. Sensation of plugged ear on right side        2. Sensorineural hearing loss (SNHL) of both ears        3. Impacted cerumen of right ear              Plan:  1Aleksandra Cates is a 73 y.o. male with acute and chronic problems as above who presents for a hearing evaluation.    Right cerumen impaction removed without difficulty. Ears well-appearing.  Hearing was tested, which demonstrates a mild sloping to moderately severe high frequency SNHL. Excellent word rec.  "    Offered options for bilateral SNHL including acceptance, lifestyle changes such as moving closer to those who are speaking, or hearing amplification.  Patient would qualify for hearing amplification based on audiogram.  Discussed hearing aid options including facilities to obtain a hearing aid from as well as costs and benefits.     Patient is not interested in hearing amplification at this time.   Recommend repeat hearing test in 1 year.    Follow up 6 months              ** Please Note: Portions of the record may have been created with voice recognition software. Occasional wrong word or \"sound a like\" substitutions may have occurred due to the inherent limitations of voice recognition software. There may also be notations and random deletions of words or characters from malfunctioning software. Read the chart carefully and recognize, using context, where substitutions/deletions have occurred.**  "

## 2024-05-30 NOTE — PROGRESS NOTES
ENT HEARING EVALUATION    Name:  Geo Cates  :  1950  Age:  73 y.o.   MRN:  [id number]  Date of Evaluation: 24     HISTORY:      Geo Cates is being seen today for an evaluation of hearing as part of their ENT visit.   EVALUATION:    Otoscopy was completed during ENT visit.    Tympanometry:   Right Ear: Type A; normal middle ear pressure and static compliance    Left Ear: Type A; normal middle ear pressure and static compliance     Speech Audiometry:  Speech Reception (SRT)   Right Ear: 25 dB HL   Left Ear: 25 dB HL    Word Recognition Scores (WRS):  Right Ear: excellent (100 % correct)     Left Ear: excellent (100 % correct)   Stimuli: [id number]    Pure Tone Audiometry:  Conventional pure tone audiometry from 250 - 8000 Hz  was obtained with good reliability and revealed the following:     Right Ear: Mild sloping to moderate sensorineural hearing loss (SNHL)    Left Ear: Mild sloping to moderate sensorineural hearing loss (SNHL)       *see attached audiogram        RECOMMENDATIONS:  Consult ENT      Stacey Tellez, CCC-A  Clinical Audiologist

## 2024-07-24 ENCOUNTER — CONSULT (OUTPATIENT)
Dept: PAIN MEDICINE | Facility: CLINIC | Age: 74
End: 2024-07-24
Payer: COMMERCIAL

## 2024-07-24 ENCOUNTER — TELEPHONE (OUTPATIENT)
Dept: PAIN MEDICINE | Facility: CLINIC | Age: 74
End: 2024-07-24

## 2024-07-24 VITALS
HEART RATE: 88 BPM | WEIGHT: 197.4 LBS | BODY MASS INDEX: 27.64 KG/M2 | RESPIRATION RATE: 18 BRPM | DIASTOLIC BLOOD PRESSURE: 68 MMHG | TEMPERATURE: 97.8 F | OXYGEN SATURATION: 98 % | SYSTOLIC BLOOD PRESSURE: 140 MMHG | HEIGHT: 71 IN

## 2024-07-24 DIAGNOSIS — I73.9 PERIPHERAL VASCULAR DISEASE (HCC): ICD-10-CM

## 2024-07-24 DIAGNOSIS — F33.41 RECURRENT MAJOR DEPRESSIVE DISORDER, IN PARTIAL REMISSION (HCC): ICD-10-CM

## 2024-07-24 DIAGNOSIS — M54.16 LUMBAR RADICULOPATHY: Primary | ICD-10-CM

## 2024-07-24 DIAGNOSIS — E11.9 TYPE 2 DIABETES MELLITUS WITH HEMOGLOBIN A1C GOAL OF LESS THAN 7.0% (HCC): ICD-10-CM

## 2024-07-24 PROCEDURE — G2211 COMPLEX E/M VISIT ADD ON: HCPCS | Performed by: ANESTHESIOLOGY

## 2024-07-24 PROCEDURE — 99204 OFFICE O/P NEW MOD 45 MIN: CPT | Performed by: ANESTHESIOLOGY

## 2024-07-24 RX ORDER — GABAPENTIN 300 MG/1
300 CAPSULE ORAL 3 TIMES DAILY
Qty: 90 CAPSULE | Refills: 2 | Status: SHIPPED | OUTPATIENT
Start: 2024-07-24

## 2024-07-24 NOTE — TELEPHONE ENCOUNTER
Prior Auth  Geo Cordon  : 1956  Pregabalin 75 mg capsule  Take 1 capsule by mouth 3 times a day   Quantity: 90.0    RX# [id number]

## 2024-07-24 NOTE — PROGRESS NOTES
Assessment  1. Lumbar radiculopathy  -     MRI lumbar spine wo contrast; Future; Expected date: 07/24/2024  -     gabapentin (NEURONTIN) 300 mg capsule; Take 1 capsule (300 mg total) by mouth 3 (three) times a day  -     Ambulatory referral to Physical Therapy; Future  2. Recurrent major depressive disorder, in partial remission (HCC)  3. Peripheral vascular disease (HCC)  4. Type 2 diabetes mellitus with hemoglobin A1c goal of less than 7.0% ([facility])    Right-sided lumbar radicular pain in the L5 and S1 dermatomal distribution accompanied by pain limited weakness numbness and paresthesias.  Patient has not yet participated with PT. Chronic pain with decreased participation with IADLs over the past 3 months.  Has been taking OTC ibuprofen and tylenol with modest benefit.  5/5 strength bilaterally, positive SLR right-sided. Reflexes 2+.  Additionally there is positive facet loading, R>L. Denies any gait instability, saddle anesthesia. On xray imaging Vertebral body heights are well-preserved. Disc space narrowing and osteophyte formation throughout the  lumbar spine. Mild facet hypertrophy.  Risks, benefits alternatives epidural steroid injections thoroughly discussed with patient.  Handouts provided questions answered to patient's satisfaction.  Lifestyle modifications extensively discussed including diet, exercise and weight loss in addition to core strengthening and smoking cessation.  Will proceed with multimodal pain therapy plan as noted below:    Plan  -MRI lumbar spine; will f/u result  -gabapentin 300 mg t.i.d. Ordered for patient; counseled regarding sedative effects of taking this medication and provided up titration calendar.  Counseled not to take medication while driving or operating heavy machinery/using stairs  -script provided for formal physical therapy for right-sided lumbar radiculopathy; Physician directed home exercise plan as per [employer] demonstrated and handouts provided that patient plans to  participate with for 1 hour, twice a week for the next 6 weeks.     There are risks associated with opioid medications, including dependence, addiction and tolerance. The patient understands and agrees to use these medications only as prescribed. Potential side effects of the medications include, but are not limited to, constipation, drowsiness, addiction, impaired judgment and risk of fatal overdose if not taken as prescribed. The patient was warned against driving while taking sedation medications or operating heavy machinery. The patient voiced understanding. Sharing medications is a felony. At this point in time, the patient is showing no signs of addiction, abuse, diversion or suicidal ideation.     [place] Prescription Drug Monitoring Program report was reviewed and was appropriate      Complete risks and benefits including bleeding, infection, tissue reaction, nerve injury and allergic reaction were discussed. The approach was demonstrated using models and literature was provided. Verbal and written consent was obtained.     My impressions and treatment recommendations were discussed in detail with the patient who verbalized understanding and had no further questions.  Discharge instructions were provided. I personally saw and examined the patient and I agree with the above discussed plan of care.    Review of external notes including PT notes, PCP notes and specialist notes was performed at this visit in addition to review of new ordered imaging and past imaging to develop or modify multidisciplinary pain plan    No orders of the defined types were placed in this encounter.      History of Present Illness    Geo Cates is a 73 y.o. male with pmhx of DM-2 (NIDDM) HgbA1c 5.9%, HTN, COPD (smoker), CKD-2 presenting with chronic lumbar radicular pain in the right L5 and S1 dermatomal distributions. Debilitating pain limited weakness numbness and paresthesias accompany the pain. The patient rates the pain at  a 8-9/10 accompanied by electric shock-like shooting features and crampy burning pain in the aforementioned dermatomal distributions.  The pain is worse in the mornings as well as the end of the day; exertion such as walking for long periods of time seems to exacerbate the pain.  The patient can hardly walk more than a few blocks without having debilitating pain.  He tries to maintain an active lifestyle and finds that the current degree of pain seems to compromises his efforts.  The pain significantly impacts independent activities of daily living and contributes to significant disability.  He has not yet attempted physical therapy formally.  He has taken nsaids, tylenol with limited relief of the pain as well.  He has never tried epidural steroid injections in the past. He denies any bowel or bladder dysfunction/incontinence, saddle anesthesia or gait instability.     I have personally reviewed and/or updated the patient's past medical history, past surgical history, family history, social history, current medications, allergies, and vital signs today.     Review of Systems   Constitutional:  Positive for activity change.   HENT: Negative.     Eyes: Negative.    Respiratory: Negative.     Cardiovascular: Negative.    Gastrointestinal: Negative.    Endocrine: Negative.    Genitourinary: Negative.    Musculoskeletal:  Positive for arthralgias, back pain, gait problem and myalgias.   Skin: Negative.    Allergic/Immunologic: Negative.    Neurological:  Positive for weakness and numbness.   Hematological: Negative.    Psychiatric/Behavioral: Negative.     All other systems reviewed and are negative.      There is no problem list on file for this patient.      Past Medical History:   Diagnosis Date    Dental disease years    Depression     Dizziness weeks    Ear problems years    HL (hearing loss) months    Hypertension     Nasal congestion months    Sleep difficulties months    Tinnitus months       History reviewed.  "No pertinent surgical history.    Family History   Problem Relation Age of Onset    Diabetes Mother     Hypertension Mother     Hypertension Father        Social History     Occupational History    Not on file   Tobacco Use    Smoking status: Every Day     Current packs/day: 1.00     Average packs/day: 1 pack/day for 35.0 years (35.0 ttl pk-yrs)     Types: Cigarettes    Smokeless tobacco: Never   Vaping Use    Vaping status: Never Used   Substance and Sexual Activity    Alcohol use: Not Currently    Drug use: Not Currently    Sexual activity: Not Currently       Current Outpatient Medications on File Prior to Visit   Medication Sig    amLODIPine (NORVASC) 10 mg tablet 1 tablet every morning    Aspirin Buf,CaCarb-MgCarb-MgO, 81 MG TABS Take 1 tablet by mouth daily    atorvastatin (LIPITOR) 40 mg tablet 1 tablet every morning    fenofibrate (TRICOR) 145 mg tablet 1 tablet every morning    hydrOXYzine HCL (ATARAX) 25 mg tablet Take 0.5 tablets by mouth 2 (two) times a day as needed    losartan (COZAAR) 50 mg tablet Take 1 and 1/2 (one and one-half) tablet daily.    metFORMIN (GLUCOPHAGE-XR) 500 mg 24 hr tablet 1 tablet every morning    tamsulosin (FLOMAX) 0.4 mg 1 capsule every morning    Melatonin 5 MG TBDP Take 1 tablet by mouth daily at bedtime (Patient not taking: Reported on 7/24/2024)     No current facility-administered medications on file prior to visit.       Allergies   Allergen Reactions    Niacin Other (See Comments)         Physical Exam    /68 (BP Location: Left arm, Patient Position: Sitting, Cuff Size: Adult)   Pulse 88   Temp 97.8 °F (36.6 °C)   Resp 18   Ht 5' 10.5\" (1.791 m)   Wt 89.5 kg (197 lb 6.4 oz)   SpO2 98%   BMI 27.92 kg/m²     Constitutional: normal, well developed, well nourished, alert, in no distress and non-toxic and no overt pain behavior. and overweight  Eyes: anicteric  HEENT: grossly intact  Neck: supple, symmetric, trachea midline and no masses   Pulmonary:even and " unlabored  Cardiovascular:No edema or pitting edema present  Skin:Normal without rashes or lesions and well hydrated  Psychiatric:Mood and affect appropriate  Neurologic:Cranial Nerves II-XII grossly intact Sensation grossly intact; no clonus negative malhotra's. Reflexes 2+ and brisk. SLR positive right sided  Musculoskeletal:normal gait. 5/5 strength bilaterally with AROM in lower extremities. Difficulty with normal heel toe and tip toe walking. Signifiicant pain with lumbar facet loading bilaterally and with lateral spine rotation, ttp over lumbar paraspinal muscles. Negative jaja's test, negative gaenslen's negative SIJ loading bilaterally.    Imaging    XR SPINE LUMBAR MINIMUM 4 VIEWS NON INJURY  Order: [id number]  Transcription    Wilson Romero III, MD - 04/01/2014  7:59 AM EDT  Formatting of this note might be different from the original.  EXAMINATION PERFORMED  LUMBOSACRAL SPINE MINIMUM OF 4 VIEWS-3/28/2014 10:58 AM    CLINICAL HISTORY  R/O DDD  OR R/O DJD    COMPARISON  No comparisons    TECHNIQUE  AP, lateral and coned down lateral view.    FINDINGS  There are five non-rib bearing lumbar-type vertebral bodies.  Normal alignment.    Vertebral body heights are well-preserved. Disc space narrowing and osteophyte formation throughout the  lumbar spine. Mild facet hypertrophy.    Scattered vascular calcifications in the abdomen and pelvis.    IMPRESSION

## 2024-07-24 NOTE — PATIENT INSTRUCTIONS
Patient Education     Gabapentin (GA ba pen tin)   Brand Names: [place] Gralise; Neurontin   Brand Names: Y[employer]uda AG-Gabapentin; APO-Gabapentin; Auro-Gabapentin; BIO-Gabapentin [DSC]; DOM-Gabapentin; GD-Gabapentin [DSC]; GLN-Gabapentin; JAMP-Gabapentin; Mar-Gabapentin [DSC]; MINT-Gabapentin; Neurontin; PMS-Gabapentin; Priva-Gabapentin [DSC]; PRO-Gabapentin; RAN-Gabapentin [DSC]; CARMEN-Gabapentin; TARO-Gabapentin [DSC]; TEVA-Gabapentin   What is this drug used for?   It is used to treat painful nerve diseases.  It is used to help control certain kinds of seizures.  It may be given to you for other reasons. Talk with the doctor.  What do I need to tell my doctor BEFORE I take this drug?   If you are allergic to this drug; any part of this drug; or any other drugs, foods, or substances. Tell your doctor about the allergy and what signs you had.  If you have kidney disease or are on dialysis.  This is not a list of all drugs or health problems that interact with this drug.  Tell your doctor and pharmacist about all of your drugs (prescription or OTC, natural products, vitamins) and health problems. You must check to make sure that it is safe for you to take this drug with all of your drugs and health problems. Do not start, stop, or change the dose of any drug without checking with your doctor.  What are some things I need to know or do while I take this drug?   For all uses of this drug:   Tell all of your health care providers that you take this drug. This includes your doctors, nurses, pharmacists, and dentists.  Avoid driving and doing other tasks or actions that call for you to be alert until you see how this drug affects you.  This drug may affect certain lab tests. Tell all of your health care providers and lab workers that you take this drug.  Have blood work checked as you have been told by the doctor. Talk with the doctor.  Talk with your doctor before you use alcohol, marijuana or other forms of cannabis, or  prescription or OTC drugs that may slow your actions.  This drug is not the same as gabapentin enacarbil (Horizant). Do not use in its place. Talk with the doctor.  Do not stop taking this drug all of a sudden without calling your doctor. You may have a greater risk of side effects. If you need to stop this drug, you will want to slowly stop it as ordered by your doctor.  A severe and sometimes deadly reaction has happened. Most of the time, this reaction has signs like fever, rash, or swollen glands with problems in body organs like the liver, kidney, blood, heart, muscles and joints, or lungs. If you have questions, talk with the doctor.  Severe breathing problems have happened with this drug in people taking certain other drugs (like opioid pain drugs). This has also happened in people who already have lung or breathing problems. The risk may also be greater in people who are older than 65. Sometimes, breathing problems have been deadly. If you have questions, talk with the doctor.  If you are 65 or older, use this drug with care. You could have more side effects.  If the patient is 3 to 12 years of age, use this drug with care. The risk of mood or behavior problems may be higher in these children.  Tell your doctor if you are pregnant, plan on getting pregnant, or are breast-feeding. You will need to talk about the benefits and risks to you and the baby.  For seizures:   If seizures are different or worse after starting this drug, talk with the doctor.  What are some side effects that I need to call my doctor about right away?   WARNING/CAUTION: Even though it may be rare, some people may have very bad and sometimes deadly side effects when taking a drug. Tell your doctor or get medical help right away if you have any of the following signs or symptoms that may be related to a very bad side effect:  Signs of an allergic reaction, like rash; hives; itching; red, swollen, blistered, or peeling skin with or without  fever; wheezing; tightness in the chest or throat; trouble breathing, swallowing, or talking; unusual hoarseness; or swelling of the mouth, face, lips, tongue, or throat.  Signs of liver problems like dark urine, tiredness, decreased appetite, upset stomach or stomach pain, light-colored stools, throwing up, or yellow skin or eyes.  Signs of kidney problems like unable to pass urine, change in how much urine is passed, blood in the urine, or a big weight gain.  Trouble controlling body movements, twitching, change in balance, trouble swallowing or speaking.  Memory problems or loss.  Change in eyesight.  Not able to control eye movements.  Feeling confused, not able to focus, or change in behavior.  Shakiness.  Trouble breathing, slow breathing, or shallow breathing.  Blue or gray color of the skin, lips, nail beds, fingers, or toes.  Swelling in the arms or legs.  Severe dizziness or passing out.  Fever, chills, or sore throat; any unexplained bruising or bleeding; or feeling very tired or weak.  Muscle pain or weakness.  Get medical help right away if you feel very sleepy, very dizzy, or if you pass out. Caregivers or others need to get medical help right away if the patient does not respond, does not answer or react like normal, or will not wake up.  Like other drugs that may be used for seizures, this drug may rarely raise the risk of suicidal thoughts or actions. The risk may be higher in people who have had suicidal thoughts or actions in the past. Call the doctor right away about any new or worse signs like depression; feeling nervous, restless, or grouchy; panic attacks; or other changes in mood or behavior. Call the doctor right away if any suicidal thoughts or actions occur.  What are some other side effects of this drug?   All drugs may cause side effects. However, many people have no side effects or only have minor side effects. Call your doctor or get medical help if any of these side effects or any  other side effects bother you or do not go away:  Feeling dizzy, sleepy, tired, or weak.  Diarrhea, upset stomach, or throwing up.  Dry mouth.  These are not all of the side effects that may occur. If you have questions about side effects, call your doctor. Call your doctor for medical advice about side effects.  You may report side effects to your national health agency.  You may report side effects to the FDA at 1-461.288.9779. You may also report side effects at https://www.fda.gov/medwatch.  How is this drug best taken?   Use this drug as ordered by your doctor. Read all information given to you. Follow all instructions closely.  All products:   Keep taking this drug as you have been told by your doctor or other health care provider, even if you feel well.  If you are taking an antacid that has aluminum or magnesium in it, take this drug at least 2 hours after taking the antacid.  Gralise:   Take with the evening meal.  Swallow whole. Do not chew, break, or crush.  All other products:   Take with or without food.  Capsules:   Swallow whole with a full glass of water.  Tablets:   You may break the tablet in half. Do not chew or crush.  If you break the tablet in half, use the other half of the tablet for the next dose, as told by the doctor. Throw away half-tablets not used within 28 days.  Liquid (solution):   Measure liquid doses carefully. Use the measuring device that comes with this drug. If there is none, ask the pharmacist for a device to measure this drug.  What do I do if I miss a dose?   Take a missed dose as soon as you think about it.  If it is close to the time for your next dose, skip the missed dose and go back to your normal time.  Do not take 2 doses at the same time or extra doses.  How do I store and/or throw out this drug?   Liquid (solution):   Store in a refrigerator. Do not freeze.  All other products:   Store at room temperature in a dry place. Do not store in a bathroom.  All dose forms:    Keep all drugs in a safe place. Keep all drugs out of the reach of children and pets.  Throw away unused or  drugs. Do not flush down a toilet or pour down a drain unless you are told to do so. Check with your pharmacist if you have questions about the best way to throw out drugs. There may be drug take-back programs in your area.  General drug facts   If your symptoms or health problems do not get better or if they become worse, call your doctor.  Do not share your drugs with others and do not take anyone else's drugs.  Some drugs may have another patient information leaflet. If you have any questions about this drug, please talk with your doctor, nurse, pharmacist, or other health care provider.  Some drugs may have another patient information leaflet. Check with your pharmacist. If you have any questions about this drug, please talk with your doctor, nurse, pharmacist, or other health care provider.  If you think there has been an overdose, call your poison control center or get medical care right away. Be ready to tell or show what was taken, how much, and when it happened.  Consumer Information Use and Disclaimer   This generalized information is a limited summary of diagnosis, treatment, and/or medication information. It is not meant to be comprehensive and should be used as a tool to help the user understand and/or assess potential diagnostic and treatment options. It does NOT include all information about conditions, treatments, medications, side effects, or risks that may apply to a specific patient. It is not intended to be medical advice or a substitute for the medical advice, diagnosis, or treatment of a health care provider based on the health care provider's examination and assessment of a patient's specific and unique circumstances. Patients must speak with a health care provider for complete information about their health, medical questions, and treatment options, including any risks or  benefits regarding use of medications. This information does not endorse any treatments or medications as safe, effective, or approved for treating a specific patient. Up[employer] Inc[employer] and its affiliates disclaim any warranty or liability relating to this information or the use thereof. The use of this information is governed by the Terms of Use, available at https://www.[employer].com/en/know/clinical-effectiveness-terms.  Last Reviewed Date   2023-05-09  Copyright   © 2024 [employer], Inc. and its affiliates and/or licensors. All rights reserved.  Patient Education     Core Strengthening Exercises on Back or on Hands and Knees   About this topic   Your core muscles are in your chest, back, buttock, and stomach area. They are your abdominal, back, and pelvis muscles. These muscles help keep your body stable when using your arms or legs. They also help with balance and posture. There are many exercises you can do to keep these muscles strong.  If you have back problems like a compression fracture or a ruptured disc, doing some of these exercises could make your problem worse. Some of these exercises may cause lower back pain.  General   Before starting with a program, ask your doctor if you are healthy enough to do these exercises. Your doctor may have you work with a , chiropractor, or physical therapist to make a safe exercise program to meet your needs.  Strengthening Exercises   Strengthening exercises keep your muscles firm and strong. Start by repeating each exercise 2 to 3 times. Work up to doing each exercise 10 times. Try to do the exercises 2 to 3 times each day. Hold each exercise for 3 to 5 seconds. Do all exercises slowly.  Hip lifts ? Lie on your back with your knees bent and feet flat on the floor. Tighten your stomach muscles and push your heels into the floor to lift your buttocks off the floor. Relax.  Pelvic tilts ? Lie on your back with your knees bent and feet flat on the floor. Tighten  your stomach muscles and press your lower back down to the floor. Relax.  Straight leg raises lying down ? Lie on your back with one leg straight. Bend your other knee so the foot is flat on the bed. Keeping your leg straight, lift the leg up to the level of your other knee. Lower it back down. Repeat with the other leg.  Knee flex lying down ? Lie on your back with both knees bent and your feet flat on the floor. Tighten your belly muscles. Raise one leg up and back down as if you are marching in slow motion. Keep belly muscles tight while you move your leg. Switch legs. To make this exercise harder, raise both arms straight up in the air. Tighten your belly muscles. When you raise one leg up, reach the opposite arm over your head. Switch, moving the opposite arm and leg until you have done 10 repetitions on each side.  Abdominal crunches ? Lie on your back with both knees bent. Keep your feet flat on the floor. Place your hands in one of these positions. Try starting with the first position since it is the easiest. As you get better, use the other positions to make it harder.  Crunches with arms at sides.  Crunches with arms across chest.  Crunches with arms behind head. Be careful not to interlock your fingers behind your neck or head while doing crunches. This may add tension to your neck and cause strain.  Look at the ceiling. Tighten your belly muscles and lift your shoulders and upper back off the floor. Breathe out while you are doing this. Lower your shoulders to the floor. Breathe in while you are doing this. Relax your belly muscles all the way before starting another crunch.  Arm and leg lifts on hands and knees ? Start on your hands and knees. With all of these exercises, keep your back as level as possible. If you are having trouble with this, you may want to put a small object on your back such as a book. If it falls off, you are not keeping your back level enough during the exercise.  Lift one arm up  to shoulder level and hold. Lower it back down. Now, lift up the other arm and hold.  Lift one leg up and kick it straight out until it is in line with your back and hold. Lower it back down. Now, lift up the other leg and hold.  Lift one arm and the OPPOSITE leg up at the same time and hold. Lower them down. Now, repeat using the other arm and leg. This is a very hard exercise. It may take time to be able to do this.               What will the results be?   Stronger core  Better balance  More toned belly and back muscles  Easier to do daily activities  Better sports performance  Less low back pain  Helpful tips   Stay active and work out to keep your muscles strong and flexible.  Keep a healthy weight to avoid putting too much stress on your spine. Eat a healthy diet to keep your muscles healthy.  Be sure you do not hold your breath when exercising. This can raise your blood pressure. If you tend to hold your breath, try counting out loud when exercising. If any exercise bothers you, stop right away.  Try walking or cycling at an easy pace for a few minutes to warm up your muscles. Do this again after exercising.  Exercise may be slightly uncomfortable, but you should not have sharp pains. If you do get sharp pains, stop what you are doing. If the sharp pains continue, call your doctor.  Last Reviewed Date   2021-03-18  Consumer Information Use and Disclaimer   This generalized information is a limited summary of diagnosis, treatment, and/or medication information. It is not meant to be comprehensive and should be used as a tool to help the user understand and/or assess potential diagnostic and treatment options. It does NOT include all information about conditions, treatments, medications, side effects, or risks that may apply to a specific patient. It is not intended to be medical advice or a substitute for the medical advice, diagnosis, or treatment of a health care provider based on the health care provider's  examination and assessment of a patient’s specific and unique circumstances. Patients must speak with a health care provider for complete information about their health, medical questions, and treatment options, including any risks or benefits regarding use of medications. This information does not endorse any treatments or medications as safe, effective, or approved for treating a specific patient. [employer] and its affiliates disclaim any warranty or liability relating to this information or the use thereof. The use of this information is governed by the Terms of Use, available at https://www.[employer].com/en/know/clinical-effectiveness-terms   Copyright   Copyright © 2024 [employer], [employer] and its affiliates and/or licensors. All rights reserved.

## 2024-07-25 NOTE — TELEPHONE ENCOUNTER
PA for GABAPENTIN 300 MG cancelled due to     []Approval on file-dates approved   [x]Medication already on Formulary  []Brand Name Preferred  []Patient no longer covered by insurance    Patient advised by     [x]My Chart Message  []Phone call

## 2024-08-01 ENCOUNTER — EVALUATION (OUTPATIENT)
Dept: PHYSICAL THERAPY | Facility: CLINIC | Age: 74
End: 2024-08-01
Payer: COMMERCIAL

## 2024-08-01 DIAGNOSIS — M54.16 LUMBAR RADICULOPATHY: Primary | ICD-10-CM

## 2024-08-01 PROCEDURE — 97535 SELF CARE MNGMENT TRAINING: CPT

## 2024-08-01 PROCEDURE — 97162 PT EVAL MOD COMPLEX 30 MIN: CPT

## 2024-08-01 NOTE — PROGRESS NOTES
PT Evaluation     Today's date: 2024  Patient name: Geo Cates  : 1950  MRN: [id number]  Referring provider: Rolf Diaz MD  Dx:   Encounter Diagnosis     ICD-10-CM    1. Lumbar radiculopathy  M54.16 Ambulatory referral to Physical Therapy          Start Time: 1300  Stop Time: 1400  Total time in clinic (min): 60 minutes    Assessment  Impairments: abnormal or restricted ROM, impaired physical strength, lacks appropriate home exercise program, pain with function, poor posture  and poor body mechanics  Symptom irritability: moderate    Assessment details: Geo is a 73 y.o male who presents to OP PT with signs and symptoms consistent with chronic lumbar pain. Upon examination, PT notes key impairments of decreased lumbar/hip ROM at end range, impaired core strength, and abnormal sitting posture. Due to this patient is limited with performing ADL/IADLs, standing/ambulating for long periods of time, bending over, and performing sit to stand activities. Additionally, patient is restricted with participating in social gatherings and recreational activities.  Patient will benefit from skilled PT to address above impairments with POC consisting of functional ROM, stretching, strengthening, balance, analgesic modalities and manual therapy in order to maximize functional independence. Thank you for your referral!     Understanding of Dx/Px/POC: good     Prognosis: good    Goals  STG(In 4 weeks)  Patient will initiate HEP.  Patient will decrease lumbar pain from 8/10 to 0/10  in order to improve QOL.  Patient will increase end range of lumbar ROM in order to be able to complete ADL/IADLs.   Patient will increase core/hip strength to WFL.   Patient will increase FOTO score from  IE to D/C score.           Plan  Patient would benefit from: PT eval and skilled physical therapy  Planned modality interventions: hydrotherapy    Planned therapy interventions: self care, postural training, patient/caregiver  education, home exercise program, strengthening, stretching, therapeutic activities, therapeutic exercise, flexibility, functional ROM exercises, graded exercise, joint mobilization, manual therapy and IASTM          Subjective Evaluation    History of Present Illness  Mechanism of injury: Patient presents with C/C of chronic lumbar pain that has been ongoing for 20 years. He indicates that his symptoms are across lumbar region and into R buttock and improves with sitting and laying supine but aggravated by standing for too long. Recently, he was prescribed gabapentin and dose/frequency to 300 mg 3x/day, which has helped improve his symptoms significantly. Additionally patient indicated that he would like an MRI after 6 visits to determine if further injury as occurred but will hold off until after therapy and pain management.     PMHx: hernia, LBP    Quality of life: good    Patient Goals  Patient goals for therapy: decreased pain, increased strength, increased motion and return to sport/leisure activities    Pain  Current pain ratin  At best pain ratin  At worst pain ratin  Location: lumbar and R buttock  Quality: sharp  Relieving factors: medications  Aggravating factors: lifting, walking and standing      Diagnostic Tests  X-ray: abnormal  Treatments  Previous treatment: medication  Current treatment: physical therapy        Objective     Active Range of Motion     Lumbar   Flexion:  WFL  Extension:  WFL  Left lateral flexion:  WFL  Right lateral flexion:  WFL  Left rotation:  WFL  Right rotation:  WFL    Strength/Myotome Testing     Left Hip   Planes of Motion   Flexion: 4  Extension: 4  Abduction: 4  External rotation: 4  Internal rotation: 4    Right Hip   Planes of Motion   Flexion: 4  Extension: 4  Abduction: 4  External rotation: 4  Internal rotation: 4    Tests     Lumbar     Left   Negative passive SLR.     Left Pelvic Girdle/Sacrum   Negative: active SLR test.     Right Pelvic Girdle/Sacrum    Negative: active SLR test.     Left Hip   Negative DONNA and FADIR.     Right Hip   Negative DONNA and FADIR.              Precautions:       Manuals 8/1/24            B/L hip long axis distraction              B/L hamstring stretch                                        Neuro Re-Ed             PPT with TA brace             PPT with march             PPT with SLR                                                                  Ther Ex             Nu-step(improve B/L strength)             3-way SLR              3-way ball roll             LTR             DKTC                                                     Ther Activity                                       Gait Training                                       Modalities             [facility]

## 2024-08-01 NOTE — LETTER
2024    Rolf Diaz MD  [address] [address]    Patient: Geo Cates   YOB: 1950   Date of Visit: 2024     Encounter Diagnosis     ICD-10-CM    1. Lumbar radiculopathy  M54.16 Ambulatory referral to Physical Therapy          Dear Dr. Diaz:    Thank you for your recent referral of Geo Cates. Please review the attached evaluation summary from Geo's recent visit.     Please verify that you agree with the plan of care by signing the attached order.     If you have any questions or concerns, please do not hesitate to call.     I sincerely appreciate the opportunity to share in the care of one of your patients and hope to have another opportunity to work with you in the near future.       Sincerely,    Zurdo Bolanos, PT      Referring Provider:      I certify that I have read the below Plan of Care and certify the need for these services furnished under this plan of treatment while under my care.                    Rolf Diaz MD  [address] [address]  Via In Basket          PT Evaluation     Today's date: 2024  Patient name: Geo Cates  : 1950  MRN: [id number]  Referring provider: Rolf Diaz MD  Dx:   Encounter Diagnosis     ICD-10-CM    1. Lumbar radiculopathy  M54.16 Ambulatory referral to Physical Therapy          Start Time: 1300  Stop Time: 1400  Total time in clinic (min): 60 minutes    Assessment  Impairments: abnormal or restricted ROM, impaired physical strength, lacks appropriate home exercise program, pain with function, poor posture  and poor body mechanics  Symptom irritability: moderate    Assessment details: Geo is a 73 y.o male who presents to OP PT with signs and symptoms consistent with chronic lumbar pain. Upon examination, PT notes key impairments of decreased lumbar/hip ROM at end range, impaired core strength, and abnormal sitting posture. Due to this patient is  limited with performing ADL/IADLs, standing/ambulating for long periods of time, bending over, and performing sit to stand activities. Additionally, patient is restricted with participating in social gatherings and recreational activities.  Patient will benefit from skilled PT to address above impairments with POC consisting of functional ROM, stretching, strengthening, balance, analgesic modalities and manual therapy in order to maximize functional independence. Thank you for your referral!     Understanding of Dx/Px/POC: good     Prognosis: good    Goals  STG(In 4 weeks)  Patient will initiate HEP.  Patient will decrease lumbar pain from 8/10 to 0/10  in order to improve QOL.  Patient will increase end range of lumbar ROM in order to be able to complete ADL/IADLs.   Patient will increase core/hip strength to WFL.   Patient will increase FOTO score from  IE to D/C score.           Plan  Patient would benefit from: PT eval and skilled physical therapy  Planned modality interventions: hydrotherapy    Planned therapy interventions: self care, postural training, patient/caregiver education, home exercise program, strengthening, stretching, therapeutic activities, therapeutic exercise, flexibility, functional ROM exercises, graded exercise, joint mobilization, manual therapy and IASTM          Subjective Evaluation    History of Present Illness  Mechanism of injury: Patient presents with C/C of chronic lumbar pain that has been ongoing for 20 years. He indicates that his symptoms are across lumbar region and into R buttock and improves with sitting and laying supine but aggravated by standing for too long. Recently, he was prescribed gabapentin and dose/frequency to 300 mg 3x/day, which has helped improve his symptoms significantly. Additionally patient indicated that he would like an MRI after 6 visits to determine if further injury as occurred but will hold off until after therapy and pain management.     PMHx:  hernia, LBP    Quality of life: good    Patient Goals  Patient goals for therapy: decreased pain, increased strength, increased motion and return to sport/leisure activities    Pain  Current pain ratin  At best pain ratin  At worst pain ratin  Location: lumbar and R buttock  Quality: sharp  Relieving factors: medications  Aggravating factors: lifting, walking and standing      Diagnostic Tests  X-ray: abnormal  Treatments  Previous treatment: medication  Current treatment: physical therapy        Objective     Active Range of Motion     Lumbar   Flexion:  WFL  Extension:  WFL  Left lateral flexion:  WFL  Right lateral flexion:  WFL  Left rotation:  WFL  Right rotation:  WFL    Strength/Myotome Testing     Left Hip   Planes of Motion   Flexion: 4  Extension: 4  Abduction: 4  External rotation: 4  Internal rotation: 4    Right Hip   Planes of Motion   Flexion: 4  Extension: 4  Abduction: 4  External rotation: 4  Internal rotation: 4    Tests     Lumbar     Left   Negative passive SLR.     Left Pelvic Girdle/Sacrum   Negative: active SLR test.     Right Pelvic Girdle/Sacrum   Negative: active SLR test.     Left Hip   Negative DONNA and FADIR.     Right Hip   Negative DONNA and FADIR.              Precautions:       Manuals 24            B/L hip long axis distraction              B/L hamstring stretch                                        Neuro Re-Ed             PPT with TA brace             PPT with march             PPT with SLR                                                                  Ther Ex             Nu-step(improve B/L strength)             3-way SLR              3-way ball roll             LTR             DKTC                                                     Ther Activity                                       Gait Training                                       Modalities             [facility]

## 2024-08-06 ENCOUNTER — OFFICE VISIT (OUTPATIENT)
Dept: PHYSICAL THERAPY | Facility: CLINIC | Age: 74
End: 2024-08-06
Payer: COMMERCIAL

## 2024-08-06 DIAGNOSIS — M54.16 LUMBAR RADICULOPATHY: Primary | ICD-10-CM

## 2024-08-06 PROCEDURE — 97140 MANUAL THERAPY 1/> REGIONS: CPT | Performed by: PHYSICAL THERAPIST

## 2024-08-06 PROCEDURE — 97110 THERAPEUTIC EXERCISES: CPT | Performed by: PHYSICAL THERAPIST

## 2024-08-06 NOTE — PROGRESS NOTES
"Daily Note     Today's date: 2024  Patient name: Geo Cates  : 1950  MRN: [id number]  Referring provider: Rolf Diaz MD  Dx:   Encounter Diagnosis     ICD-10-CM    1. Lumbar radiculopathy  M54.16                      Subjective: Patient states \"I'm doing alright today\".      Objective: See treatment diary below      Assessment: Patient with good tolerance to first treatment today. Minimal VC's required for correct performance of TE. Improvement in symptoms reported after treatment today.       Plan: Continue per plan of care.      Precautions:       Manuals 24           B/L hip long axis distraction   MK           B/L hamstring stretch   MK                                     Neuro Re-Ed             PPT with TA brace             PPT with march  2x10 B/L           PPT with SLR                                                                  Ther Ex             Nu-step(improve B/L strength)  L2 5 min           3-way SLR   2x10 B/L           3-way ball roll  10x5\" each dir           LTR  2x10 B/L           DKTC                                                     Ther Activity                                       Gait Training                                       Modalities             [facility]                                "

## 2024-08-08 ENCOUNTER — OFFICE VISIT (OUTPATIENT)
Dept: PHYSICAL THERAPY | Facility: CLINIC | Age: 74
End: 2024-08-08
Payer: COMMERCIAL

## 2024-08-08 DIAGNOSIS — M54.16 LUMBAR RADICULOPATHY: Primary | ICD-10-CM

## 2024-08-08 PROCEDURE — 97110 THERAPEUTIC EXERCISES: CPT

## 2024-08-08 PROCEDURE — 97140 MANUAL THERAPY 1/> REGIONS: CPT

## 2024-08-08 NOTE — PROGRESS NOTES
"Daily Note     Today's date: 2024  Patient name: Geo Cates  : 1950  MRN: [id number]  Referring provider: Rolf Diaz MD  Dx:   Encounter Diagnosis     ICD-10-CM    1. Lumbar radiculopathy  M54.16           Start Time: 1300  Stop Time: 1352  Total time in clinic (min): 52 minutes    Subjective: Patient states that the left side of his back is bothering him today.           Objective: See treatment diary below.          Assessment:  Focused on improving bilateral hip strength and hamstring/piriformis flexibility during today's therapy session. Therapeutic exercise program was completed with good technique and post-exercise fatigue present. Added in piriformis stretching bilaterally at end of therapy session. Continue to recommend PT in order to achieve maximal functional independence.        Plan: Continue per plan of care.      Precautions:       Manuals 24          B/L hip long axis distraction   TARUN BRADY          B/L hamstring/piriformis stretch   TARUN BC                                    Neuro Re-Ed             PPT with TA brace             PPT with march  2x10 B/L 2x10 B/L           PPT with SLR                                                                  Ther Ex             Nu-step(improve B/L strength)  L2 5 min L2 6 min           3-way SLR   2x10 B/L 2x10 B/L           3-way ball roll  10x5\" each dir 10x10\" ea dir           LTR  2x10 B/L 2x10 B/L          SKTC    10x5\" ea                                                  Ther Activity                                       Gait Training                                       Modalities             [facility]                                  "

## 2024-08-13 ENCOUNTER — OFFICE VISIT (OUTPATIENT)
Dept: PHYSICAL THERAPY | Facility: CLINIC | Age: 74
End: 2024-08-13
Payer: COMMERCIAL

## 2024-08-13 DIAGNOSIS — M54.16 LUMBAR RADICULOPATHY: Primary | ICD-10-CM

## 2024-08-13 PROCEDURE — 97110 THERAPEUTIC EXERCISES: CPT

## 2024-08-13 PROCEDURE — 97140 MANUAL THERAPY 1/> REGIONS: CPT

## 2024-08-13 NOTE — PROGRESS NOTES
"Daily Note     Today's date: 2024  Patient name: Geo Cates  : 1950  MRN: [id number]  Referring provider: Rolf Diaz MD  Dx:   Encounter Diagnosis     ICD-10-CM    1. Lumbar radiculopathy  M54.16           Start Time: 1200  Stop Time: 1240  Total time in clinic (min): 40 minutes    Subjective: Patient states that his back bothers him when he is standing for short or long periods of time.           Objective: See treatment diary below.          Assessment: Began therapy session on nu-step for warm-up. Focused on improving core/hip strength  and bilateral hamstring/piriformis flexibility during today's therapy session. Therapeutic exercise program was completed with good technique and post-exercise fatigue present . Continue to recommend PT in order to achieve maximal functional independence.            Plan: Continue per plan of care.      Precautions:       Manuals 24         B/L hip long axis distraction   MK BC BC         B/L hamstring/piriformis stretch    BC BC                                   Neuro Re-Ed             PPT with TA brace    20x hold 5\"         PPT with march  2x10 B/L 2x10 B/L  PPT 2x10 BL 2#                                                              Ther Ex             Nu-step(improve B/L strength)  L2 5 min L2 6 min  L2 10'          3-way SLR   2x10 B/L 2x10 B/L  2x10 B/L          3-way ball roll  10x5\" each dir 10x10\" ea dir  10x10\" ea dir          LTR  2x10 B/L 2x10 B/L 10x5\" B/L          SKTC    10x5\" ea  NT                                                Ther Activity                                       Gait Training                                       Modalities             [facility]                                    "

## 2024-08-15 ENCOUNTER — OFFICE VISIT (OUTPATIENT)
Dept: PHYSICAL THERAPY | Facility: CLINIC | Age: 74
End: 2024-08-15
Payer: COMMERCIAL

## 2024-08-15 DIAGNOSIS — M54.16 LUMBAR RADICULOPATHY: Primary | ICD-10-CM

## 2024-08-15 PROCEDURE — 97110 THERAPEUTIC EXERCISES: CPT

## 2024-08-15 PROCEDURE — 97140 MANUAL THERAPY 1/> REGIONS: CPT

## 2024-08-15 NOTE — PROGRESS NOTES
"Daily Note     Today's date: 8/15/2024  Patient name: Geo Cates  : 1950  MRN: [id number]  Referring provider: Rolf Diaz MD  Dx:   Encounter Diagnosis     ICD-10-CM    1. Lumbar radiculopathy  M54.16           Start Time: 1300  Stop Time: 1350  Total time in clinic (min): 50 minutes    Subjective: Patient states that [facility] and noticed pain across entire portion of low back. Today is indicates that pain has improved.         Objective: See treatment diary below        Assessment: Focused on improving lumbar flexibility with flexion bias and core strength during today's therapy session. Instructed and performed PPT with ADD ball squeeze to improve hip/core strength. When performing 3-way ball roll he felt more of stretch on left side compared to right. Therapeutic exercise program was completed with good technique and post-exercise fatigue present. Continue to recommend PT in order to achieve maximal functional independence.      Plan: Continue per plan of care.      Precautions:       Manuals 8/1/24 8/6 8/8 8/13 8/15        B/L hip long axis distraction   MK BC BC BC        B/L hamstring/piriformis stretch   [facility] BC BC                                  Neuro Re-Ed             PPT with TA brace    20x hold 5\" 20x hold 5\"         PPT with march  2x10 B/L 2x10 B/L  PPT 2x10 BL 2#  PPT 2x10 BL 2#         PPT with ADD ball squeeze      20x 3\" hold        PPT with supine clamshell                                       Ther Ex             Nu-step(improve B/L strength)  L2 5 min L2 6 min  L2 10'  L2 10'        TR/HR     20x         3-way SLR   2x10 B/L 2x10 B/L  2x10 B/L  10x 2#         1/4 squat     10x        3-way ball roll  10x5\" each dir 10x10\" ea dir  10x10\" ea dir  10x10\" ea dir         LTR  2x10 B/L 2x10 B/L 10x5\" B/L  10x5\" hold                                                Ther Activity                                       Gait Training                        "                Modalities             P

## 2024-08-20 ENCOUNTER — OFFICE VISIT (OUTPATIENT)
Dept: PHYSICAL THERAPY | Facility: CLINIC | Age: 74
End: 2024-08-20
Payer: COMMERCIAL

## 2024-08-20 DIAGNOSIS — M54.16 LUMBAR RADICULOPATHY: Primary | ICD-10-CM

## 2024-08-20 PROCEDURE — 97140 MANUAL THERAPY 1/> REGIONS: CPT

## 2024-08-20 PROCEDURE — 97110 THERAPEUTIC EXERCISES: CPT

## 2024-08-20 NOTE — PROGRESS NOTES
"Daily Note     Today's date: 2024  Patient name: Geo Cates  : 1950  MRN: [id number]  Referring provider: Rolf Diaz MD  Dx:   Encounter Diagnosis     ICD-10-CM    1. Lumbar radiculopathy  M54.16           Start Time: 1200          Subjective: Patient stated  \" doing alright.\" Patient stated that he will be setting up an appointment for an MRI.       Objective: See treatment diary below      Assessment: Patient completed Nustep with good tolerance to increased intensity. He demonstrated good technique with standing TE, including intensity as well. Some discomfort with standing TE, however noted a \"good discomfort\" and was able to tolerate and complete.       Plan: Will await recommendations from MD.      Precautions:       Manuals 8/20 8/6 8/8 8/13 8/15   B/L hip long axis distraction    BC BC BC   B/L hamstring/piriformis stretch  KW  MK BC BC BC                   Neuro Re-Ed        PPT with TA brace 20x hold 5\"   20x hold 5\" 20x hold 5\"    PPT with march 2x10 B/L  2x10 B/L 2x10 B/L  PPT 2x10 BL 2#  PPT 2x10 BL 2#    PPT with ADD ball squeeze  20x3\" hold     20x 3\" hold   PPT with supine clamshell                        Ther Ex        Nu-step(improve B/L strength) L4 10 min L2 5 min L2 6 min  L2 10'  L2 10'   TR/HR 20x     20x    3-way SLR  2# 2x10 B/L 2x10 B/L 2x10 B/L  2x10 B/L  10x 2#    1/4 squat     10x   3-way ball roll 10x10\" ea dir 10x5\" each dir 10x10\" ea dir  10x10\" ea dir  10x10\" ea dir    LTR 10x5\" hold  2x10 B/L 2x10 B/L 10x5\" B/L  10x5\" hold                            Ther Activity                        Gait Training                        Modalities        [facility]                              "

## 2024-08-26 ENCOUNTER — HOSPITAL ENCOUNTER (OUTPATIENT)
Dept: MRI IMAGING | Facility: HOSPITAL | Age: 74
Discharge: HOME | End: 2024-08-26
Payer: COMMERCIAL

## 2024-08-26 DIAGNOSIS — M54.16 LUMBAR RADICULOPATHY: ICD-10-CM

## 2024-08-26 PROCEDURE — 72148 MRI LUMBAR SPINE W/O DYE: CPT

## 2024-08-28 ENCOUNTER — TELEPHONE (OUTPATIENT)
Dept: PAIN MEDICINE | Facility: CLINIC | Age: 74
End: 2024-08-28

## 2024-08-29 ENCOUNTER — PREP FOR PROCEDURE (OUTPATIENT)
Dept: PAIN MEDICINE | Facility: CLINIC | Age: 74
End: 2024-08-29

## 2024-08-29 DIAGNOSIS — M54.16 LUMBAR RADICULOPATHY: Primary | ICD-10-CM

## 2024-08-29 NOTE — TELEPHONE ENCOUNTER
L4-L5: Normal disc height. Moderate circumferential annular bulge. No focal disc herniation. Mild bilateral facet arthropathy and ligamentum flavum thickening. There is a well-circumscribed cystic structure within the right posterior lateral epidural   space medial to the facet joint measuring 1 cm 4.5 mm consistent with synovial cyst. These changes result in moderate tricompartmental canal stenosis in particular distorting the right posterior lateral aspect of the thecal sac. There is moderate left   and mild right foraminal narrowing identified.     L5-S1: Mild circumferential annular bulging and facet arthropathy. No disc herniation. No canal stenosis. Moderate bilateral foraminal narrowing with mild mass effect upon both nerves.    Above MRI findings discussed with patient via phone call; will plan for ILESI at L5-S1 to target radicular pain; informed consent to be obtained day of procedure.

## 2024-08-29 NOTE — TELEPHONE ENCOUNTER
Spoke to patient, he is scheduled for his procedure. Patient aware of instructions. No food/drink one hour prior. Wear comfortable clothing. A  is required. Takes 81mg baby aspirin which is ok. Continue all other prescribed medications on day of procedure. Call if prescribed an antibiotic or become sick. Refrain from vaccinations two weeks prior and two weeks after. Patient aware [facility] nurse will call day prior with instructions and report time. Call with questions.

## 2024-09-17 ENCOUNTER — HOSPITAL ENCOUNTER (OUTPATIENT)
Dept: INTERVENTIONAL RADIOLOGY/VASCULAR | Facility: HOSPITAL | Age: 74
Discharge: HOME | End: 2024-09-17
Attending: ANESTHESIOLOGY
Payer: COMMERCIAL

## 2024-09-17 VITALS
TEMPERATURE: 98 F | WEIGHT: 197 LBS | SYSTOLIC BLOOD PRESSURE: 179 MMHG | RESPIRATION RATE: 18 BRPM | BODY MASS INDEX: 27.58 KG/M2 | HEIGHT: 71 IN | OXYGEN SATURATION: 98 % | HEART RATE: 84 BPM | DIASTOLIC BLOOD PRESSURE: 81 MMHG

## 2024-09-17 DIAGNOSIS — M54.16 LUMBAR RADICULOPATHY: ICD-10-CM

## 2024-09-17 LAB — GLUCOSE SERPL-MCNC: 121 MG/DL (ref 65–140)

## 2024-09-17 PROCEDURE — 62323 NJX INTERLAMINAR LMBR/SAC: CPT | Performed by: ANESTHESIOLOGY

## 2024-09-17 PROCEDURE — 82948 REAGENT STRIP/BLOOD GLUCOSE: CPT

## 2024-09-17 RX ORDER — 0.9 % SODIUM CHLORIDE 0.9 %
VIAL (ML) INJECTION AS NEEDED
Status: COMPLETED | OUTPATIENT
Start: 2024-09-17 | End: 2024-09-17

## 2024-09-17 RX ORDER — LIDOCAINE HYDROCHLORIDE 10 MG/ML
INJECTION, SOLUTION EPIDURAL; INFILTRATION; INTRACAUDAL; PERINEURAL AS NEEDED
Status: COMPLETED | OUTPATIENT
Start: 2024-09-17 | End: 2024-09-17

## 2024-09-17 RX ORDER — METHYLPREDNISOLONE ACETATE 80 MG/ML
INJECTION, SUSPENSION INTRA-ARTICULAR; INTRALESIONAL; INTRAMUSCULAR; PARENTERAL; SOFT TISSUE AS NEEDED
Status: COMPLETED | OUTPATIENT
Start: 2024-09-17 | End: 2024-09-17

## 2024-09-17 RX ADMIN — SODIUM CHLORIDE 3 ML: 9 INJECTION, SOLUTION INTRAMUSCULAR; INTRAVENOUS; SUBCUTANEOUS at 12:40

## 2024-09-17 RX ADMIN — IOHEXOL 1 ML: 240 INJECTION, SOLUTION INTRATHECAL; INTRAVASCULAR; INTRAVENOUS; ORAL at 12:40

## 2024-09-17 RX ADMIN — METHYLPREDNISOLONE ACETATE 80 MG: 80 INJECTION, SUSPENSION INTRA-ARTICULAR; INTRALESIONAL; INTRAMUSCULAR; SOFT TISSUE at 12:40

## 2024-09-17 RX ADMIN — LIDOCAINE HYDROCHLORIDE 5 ML: 10 INJECTION, SOLUTION EPIDURAL; INFILTRATION; INTRACAUDAL; PERINEURAL at 12:39

## 2024-09-17 NOTE — H&P
Assessment  1. Lumbar radiculopathy  -     IR spine and pain procedure; Standing  -     IR spine and pain procedure    Right-sided lumbar radicular pain in the L5 and S1 dermatomal distribution accompanied by pain limited weakness numbness and paresthesias.  Patient has not yet participated with PT. Chronic pain with decreased participation with IADLs over the past 3 months.  Has been taking OTC ibuprofen and tylenol with modest benefit.  5/5 strength bilaterally, positive SLR right-sided. Reflexes 2+.  Additionally there is positive facet loading, R>L. Denies any gait instability, saddle anesthesia. On xray imaging Vertebral body heights are well-preserved. Disc space narrowing and osteophyte formation throughout the  lumbar spine. Mild facet hypertrophy.  Risks, benefits alternatives epidural steroid injections thoroughly discussed with patient.  Handouts provided questions answered to patient's satisfaction.  Lifestyle modifications extensively discussed including diet, exercise and weight loss in addition to core strengthening and smoking cessation.  Will proceed with multimodal pain therapy plan as noted below:    Plan  -MRI lumbar spine; will f/u result  -gabapentin 300 mg t.i.d. Ordered for patient; counseled regarding sedative effects of taking this medication and provided up titration calendar.  Counseled not to take medication while driving or operating heavy machinery/using stairs  -script provided for formal physical therapy for right-sided lumbar radiculopathy; Physician directed home exercise plan as per [employer] demonstrated and handouts provided that patient plans to participate with for 1 hour, twice a week for the next 6 weeks.     There are risks associated with opioid medications, including dependence, addiction and tolerance. The patient understands and agrees to use these medications only as prescribed. Potential side effects of the medications include, but are not limited to, constipation,  drowsiness, addiction, impaired judgment and risk of fatal overdose if not taken as prescribed. The patient was warned against driving while taking sedation medications or operating heavy machinery. The patient voiced understanding. Sharing medications is a felony. At this point in time, the patient is showing no signs of addiction, abuse, diversion or suicidal ideation.     [place] Prescription Drug Monitoring Program report was reviewed and was appropriate      Complete risks and benefits including bleeding, infection, tissue reaction, nerve injury and allergic reaction were discussed. The approach was demonstrated using models and literature was provided. Verbal and written consent was obtained.     My impressions and treatment recommendations were discussed in detail with the patient who verbalized understanding and had no further questions.  Discharge instructions were provided. I personally saw and examined the patient and I agree with the above discussed plan of care.    Review of external notes including PT notes, PCP notes and specialist notes was performed at this visit in addition to review of new ordered imaging and past imaging to develop or modify multidisciplinary pain plan    No orders of the defined types were placed in this encounter.      History of Present Illness    Geo Cates is a 74 y.o. male with pmhx of DM-2 (NIDDM) HgbA1c 5.9%, HTN, COPD (smoker), CKD-2 presenting with chronic lumbar radicular pain in the right L5 and S1 dermatomal distributions. Debilitating pain limited weakness numbness and paresthesias accompany the pain. The patient rates the pain at a 8-9/10 accompanied by electric shock-like shooting features and crampy burning pain in the aforementioned dermatomal distributions.  The pain is worse in the mornings as well as the end of the day; exertion such as walking for long periods of time seems to exacerbate the pain.  The patient can hardly walk more than a few blocks  without having debilitating pain.  He tries to maintain an active lifestyle and finds that the current degree of pain seems to compromises his efforts.  The pain significantly impacts independent activities of daily living and contributes to significant disability.  He has not yet attempted physical therapy formally.  He has taken nsaids, tylenol with limited relief of the pain as well.  He has never tried epidural steroid injections in the past. He denies any bowel or bladder dysfunction/incontinence, saddle anesthesia or gait instability.     I have personally reviewed and/or updated the patient's past medical history, past surgical history, family history, social history, current medications, allergies, and vital signs today.     Review of Systems   Constitutional:  Positive for activity change.   HENT: Negative.     Eyes: Negative.    Respiratory: Negative.     Cardiovascular: Negative.    Gastrointestinal: Negative.    Endocrine: Negative.    Genitourinary: Negative.    Musculoskeletal:  Positive for arthralgias, back pain, gait problem and myalgias.   Skin: Negative.    Allergic/Immunologic: Negative.    Neurological:  Positive for weakness and numbness.   Hematological: Negative.    Psychiatric/Behavioral: Negative.     All other systems reviewed and are negative.      Patient Active Problem List   Diagnosis   • Recurrent major depressive disorder, in partial remission (HCC)   • Peripheral vascular disease (HCC)   • Type 2 diabetes mellitus with hemoglobin A1c goal of less than 7.0% ([facility])       Past Medical History:   Diagnosis Date   • Dental disease years   • Depression    • Diabetes mellitus (HCC)    • Dizziness weeks   • Ear problems years   • HL (hearing loss) months   • Hypertension    • Nasal congestion months   • Sleep difficulties months   • Tinnitus months       History reviewed. No pertinent surgical history.    Family History   Problem Relation Age of Onset   • Diabetes Mother    • Hypertension  "Mother    • Hypertension Father        Social History     Occupational History   • Not on file   Tobacco Use   • Smoking status: Every Day     Current packs/day: 1.00     Average packs/day: 1 pack/day for 35.0 years (35.0 ttl pk-yrs)     Types: Cigarettes   • Smokeless tobacco: Never   Vaping Use   • Vaping status: Never Used   Substance and Sexual Activity   • Alcohol use: Not Currently   • Drug use: Not Currently   • Sexual activity: Not Currently       Current Outpatient Medications on File Prior to Encounter   Medication Sig   • amLODIPine (NORVASC) 10 mg tablet 1 tablet every morning   • Aspirin Buf,CaCarb-MgCarb-MgO, 81 MG TABS Take 1 tablet by mouth daily   • atorvastatin (LIPITOR) 40 mg tablet 1 tablet every morning   • fenofibrate (TRICOR) 145 mg tablet 1 tablet every morning   • gabapentin (NEURONTIN) 300 mg capsule Take 1 capsule (300 mg total) by mouth 3 (three) times a day   • hydrOXYzine HCL (ATARAX) 25 mg tablet Take 0.5 tablets by mouth 2 (two) times a day as needed   • losartan (COZAAR) 50 mg tablet Take 1 and 1/2 (one and one-half) tablet daily.   • metFORMIN (GLUCOPHAGE-XR) 500 mg 24 hr tablet 1 tablet every morning   • tamsulosin (FLOMAX) 0.4 mg 1 capsule every morning   • Melatonin 5 MG TBDP Take 1 tablet by mouth daily at bedtime (Patient not taking: Reported on 7/24/2024)     No current facility-administered medications on file prior to encounter.       Allergies   Allergen Reactions   • Niacin Other (See Comments)         Physical Exam    /77   Pulse 67   Temp (!) 97.3 °F (36.3 °C) (Temporal)   Resp 20   Ht 5' 10.5\" (1.791 m)   Wt 89.4 kg (197 lb)   SpO2 98%   BMI 27.87 kg/m²     Constitutional: normal, well developed, well nourished, alert, in no distress and non-toxic and no overt pain behavior. and overweight  Eyes: anicteric  HEENT: grossly intact  Neck: supple, symmetric, trachea midline and no masses   Pulmonary:even and unlabored  Cardiovascular:No edema or pitting edema " present  Skin:Normal without rashes or lesions and well hydrated  Psychiatric:Mood and affect appropriate  Neurologic:Cranial Nerves II-XII grossly intact Sensation grossly intact; no clonus negative malhotra's. Reflexes 2+ and brisk. SLR positive right sided  Musculoskeletal:normal gait. 5/5 strength bilaterally with AROM in lower extremities. Difficulty with normal heel toe and tip toe walking. Signifiicant pain with lumbar facet loading bilaterally and with lateral spine rotation, ttp over lumbar paraspinal muscles. Negative jaja's test, negative gaenslen's negative SIJ loading bilaterally.    Imaging    XR SPINE LUMBAR MINIMUM 4 VIEWS NON INJURY  Order: [id number]  Transcription    Wilson Romero III, MD - 04/01/2014  7:59 AM EDT  Formatting of this note might be different from the original.  EXAMINATION PERFORMED  LUMBOSACRAL SPINE MINIMUM OF 4 VIEWS-3/28/2014 10:58 AM    CLINICAL HISTORY  R/O DDD  OR R/O DJD    COMPARISON  No comparisons    TECHNIQUE  AP, lateral and coned down lateral view.    FINDINGS  There are five non-rib bearing lumbar-type vertebral bodies.  Normal alignment.    Vertebral body heights are well-preserved. Disc space narrowing and osteophyte formation throughout the  lumbar spine. Mild facet hypertrophy.    Scattered vascular calcifications in the abdomen and pelvis.    IMPRESSION

## 2024-09-30 ENCOUNTER — OFFICE VISIT (OUTPATIENT)
Dept: SURGERY | Facility: CLINIC | Age: 74
End: 2024-09-30
Payer: COMMERCIAL

## 2024-09-30 VITALS
OXYGEN SATURATION: 95 % | HEIGHT: 71 IN | HEART RATE: 84 BPM | WEIGHT: 194 LBS | SYSTOLIC BLOOD PRESSURE: 142 MMHG | BODY MASS INDEX: 27.16 KG/M2 | DIASTOLIC BLOOD PRESSURE: 72 MMHG | TEMPERATURE: 97.9 F

## 2024-09-30 DIAGNOSIS — K40.90 LEFT INGUINAL HERNIA: Primary | ICD-10-CM

## 2024-09-30 DIAGNOSIS — Z72.0 TOBACCO USE: ICD-10-CM

## 2024-09-30 PROCEDURE — 99204 OFFICE O/P NEW MOD 45 MIN: CPT | Performed by: SURGERY

## 2024-09-30 RX ORDER — SODIUM CHLORIDE, SODIUM LACTATE, POTASSIUM CHLORIDE, CALCIUM CHLORIDE 600; 310; 30; 20 MG/100ML; MG/100ML; MG/100ML; MG/100ML
125 INJECTION, SOLUTION INTRAVENOUS CONTINUOUS
OUTPATIENT
Start: 2024-09-30

## 2024-09-30 RX ORDER — HEPARIN SODIUM 5000 [USP'U]/ML
5000 INJECTION, SOLUTION INTRAVENOUS; SUBCUTANEOUS EVERY 8 HOURS SCHEDULED
OUTPATIENT
Start: 2024-09-30

## 2024-09-30 NOTE — ASSESSMENT & PLAN NOTE
Active tobacco user, smoking 1 pack a day at least for the last 50 years  Smoking cessation counseling provided.

## 2024-09-30 NOTE — PROGRESS NOTES
Ambulatory Visit  Name: Goe Cates      : 1950      MRN: [id number]  Encounter Provider: Austin Bailon DO  Encounter Date: 2024   Encounter department: [facility]    Assessment & Plan  Left inguinal hernia  74 year old male presents with left inguinal hernia present for at least ten years.  Patient does not report significant pain from inguinal hernia but does state it has been recurring more frequently than previously and causes some discomfort.  He previously was seen by urology who had concern for bladder involvement and left inguinal hernia.    On exam, left groin appears more full to right groin however no discernible hernia.  Patient reports his hernia is easily reducible but in the morning will typically bulge however this did not happen today.    He is an active smoker, smoking 1 pack a day for greater than 50 years.  Active drinker, drinking 3 beers a day.  He denies previous abdominal surgeries.    Plan:  Given concern for bladder involvement and left inguinal hernia, will obtain CT abdomen pelvis without contrast to be done prior to surgical intervention  Recent lab values reviewed personally, by Dr. Bailon and with the patient.  No further labs are required prior to surgical intervention  Current medications reviewed in epic and with patient, patient takes aspirin 81 daily.  Instructed to continue taking aspirin and does not need to hold prior to operation  Will obtain EKG preoperatively  Informed consent obtained at bedside, patient to schedule left inguinal hernia repair at his earliest convenience pending OR and surgeon availability    Tobacco use  Active tobacco user, smoking 1 pack a day at least for the last 50 years  Smoking cessation counseling provided.      History of Present Illness     Geo Cates is a 74 y.o. male with past medical history of hypertension, depression, tobacco use, alcohol use, type 2 diabetes, BPH who presents due to concerns of left  inguinal hernia that has been becoming more symptomatic over the last few months.  Patient reports hernia has been present for years, however has been recurring more frequently despite reduction.  He denies significant pain at hernia site and reports he is always able to reduce it.  He was seen by his urologist who had concern for possible bladder involvement and large left inguinal hernia prompting him to present for evaluation today.  He denies fever, chills, constipation, nausea, vomiting.  No prior abdominal surgeries.    Past medical history and past surgical history reviewed with patient and with epic documentation.  Current medications reviewed in epic and with patient at bedside  Labs from 9/10/2024 reviewed with patient at bedside    History obtained from : patient  Review of Systems   Constitutional:  Negative for activity change and appetite change.   HENT:  Negative for congestion.    Respiratory:  Negative for shortness of breath.    Cardiovascular:  Negative for chest pain.   Gastrointestinal:  Negative for abdominal distention, abdominal pain, nausea and vomiting.   Genitourinary:  Positive for decreased urine volume and difficulty urinating.   Neurological:  Negative for weakness.   Psychiatric/Behavioral:  Negative for agitation, behavioral problems and confusion.    All other systems reviewed and are negative.    Past Medical History   Past Medical History:   Diagnosis Date    Dental disease years    Depression     Diabetes mellitus (HCC)     Dizziness weeks    Ear problems years    HL (hearing loss) months    Hypertension     Nasal congestion months    Sleep difficulties months    Tinnitus months     Past Surgical History:   Procedure Laterality Date    IR SPINE AND PAIN PROCEDURE  9/17/2024     Family History   Problem Relation Age of Onset    Diabetes Mother     Hypertension Mother     Hypertension Father     Heart disease Father      Current Outpatient Medications on File Prior to Visit  "  Medication Sig Dispense Refill    amLODIPine (NORVASC) 10 mg tablet 1 tablet every morning      Aspirin Buf,CaCarb-MgCarb-MgO, 81 MG TABS Take 1 tablet by mouth daily      atorvastatin (LIPITOR) 40 mg tablet 1 tablet every morning      fenofibrate (TRICOR) 145 mg tablet 1 tablet every morning      gabapentin (NEURONTIN) 300 mg capsule Take 1 capsule (300 mg total) by mouth 3 (three) times a day 90 capsule 2    hydrOXYzine HCL (ATARAX) 25 mg tablet Take 0.5 tablets by mouth 2 (two) times a day as needed      losartan (COZAAR) 50 mg tablet Take 1 and 1/2 (one and one-half) tablet daily.      Melatonin 5 MG TBDP Take 1 tablet by mouth daily at bedtime (Patient not taking: Reported on 7/24/2024)      metFORMIN (GLUCOPHAGE-XR) 500 mg 24 hr tablet 1 tablet every morning      tamsulosin (FLOMAX) 0.4 mg 1 capsule every morning       No current facility-administered medications on file prior to visit.     Allergies   Allergen Reactions    Niacin Other (See Comments)          Objective     /72 (BP Location: Left arm, Patient Position: Sitting, Cuff Size: Standard)   Pulse 84   Temp 97.9 °F (36.6 °C) (Temporal)   Ht 5' 10.5\" (1.791 m)   Wt 88 kg (194 lb)   SpO2 95%   BMI 27.44 kg/m²     Physical Exam  Vitals and nursing note reviewed.   Constitutional:       General: He is not in acute distress.     Appearance: Normal appearance. He is not ill-appearing.   HENT:      Head: Normocephalic and atraumatic.      Mouth/Throat:      Mouth: Mucous membranes are moist.      Pharynx: Oropharynx is clear.   Eyes:      Extraocular Movements: Extraocular movements intact.   Cardiovascular:      Rate and Rhythm: Normal rate and regular rhythm.   Pulmonary:      Effort: Pulmonary effort is normal.   Abdominal:      General: Abdomen is flat. There is no distension.      Palpations: Abdomen is soft.      Tenderness: There is no abdominal tenderness. There is no guarding or rebound.      Comments: Left groin however no " reproducible hernia with cough or Valsalva.  No defect appreciated.   Musculoskeletal:         General: Normal range of motion.      Cervical back: Neck supple.   Skin:     General: Skin is warm and dry.      Coloration: Skin is not jaundiced.   Neurological:      General: No focal deficit present.      Mental Status: He is alert and oriented to person, place, and time. Mental status is at baseline.   Psychiatric:         Mood and Affect: Mood normal.         Behavior: Behavior normal.         Thought Content: Thought content normal.       Administrative Statements   I have spent a total time of 45 minutes in caring for this patient on the day of the visit/encounter including Diagnostic results, Prognosis, Risks and benefits of tx options, Risk factor reductions, Impressions, Counseling / Coordination of care, Documenting in the medical record, Reviewing / ordering tests, medicine, procedures  , and Obtaining or reviewing history  .

## 2024-09-30 NOTE — ASSESSMENT & PLAN NOTE
74 year old male presents with left inguinal hernia present for at least ten years.  Patient does not report significant pain from inguinal hernia but does state it has been recurring more frequently than previously and causes some discomfort.  He previously was seen by urology who had concern for bladder involvement and left inguinal hernia.    On exam, left groin appears more full to right groin however no discernible hernia.  Patient reports his hernia is easily reducible but in the morning will typically bulge however this did not happen today.    He is an active smoker, smoking 1 pack a day for greater than 50 years.  Active drinker, drinking 3 beers a day.  He denies previous abdominal surgeries.    Plan:  Given concern for bladder involvement and left inguinal hernia, will obtain CT abdomen pelvis without contrast to be done prior to surgical intervention  Recent lab values reviewed personally, by Dr. Bailon and with the patient.  No further labs are required prior to surgical intervention  Current medications reviewed in epic and with patient, patient takes aspirin 81 daily.  Instructed to continue taking aspirin and does not need to hold prior to operation  Will obtain EKG preoperatively  Informed consent obtained at bedside, patient to schedule left inguinal hernia repair at his earliest convenience pending OR and surgeon availability

## 2024-09-30 NOTE — H&P
Ambulatory Visit  Name: Geo Cates      : 1950      MRN: [id number]  Encounter Provider: Austin Bailon DO  Encounter Date: 2024   Encounter department: [facility]    Assessment & Plan  Left inguinal hernia  74 year old male presents with left inguinal hernia present for at least ten years.  Patient does not report significant pain from inguinal hernia but does state it has been recurring more frequently than previously and causes some discomfort.  He previously was seen by urology who had concern for bladder involvement and left inguinal hernia.     On exam, left groin appears more full to right groin however no discernible hernia.  Patient reports his hernia is easily reducible but in the morning will typically bulge however this did not happen today.     He is an active smoker, smoking 1 pack a day for greater than 50 years.  Active drinker, drinking 3 beers a day.  He denies previous abdominal surgeries.     Plan:  Given concern for bladder involvement and left inguinal hernia, will obtain CT abdomen pelvis without contrast to be done prior to surgical intervention  Recent lab values reviewed personally, by Dr. Bailon and with the patient.  No further labs are required prior to surgical intervention  Current medications reviewed in epic and with patient, patient takes aspirin 81 daily.  Instructed to continue taking aspirin and does not need to hold prior to operation  Will obtain EKG preoperatively  Informed consent obtained at bedside, patient to schedule left inguinal hernia repair at his earliest convenience pending OR and surgeon availability     Tobacco use  Active tobacco user, smoking 1 pack a day at least for the last 50 years  Smoking cessation counseling provided.    Orders:    CT abdomen pelvis without contrast; Future    ECG 12 lead; Future      History of Present Illness     Geo Cates is a 74 y.o. male with past medical history of hypertension, depression,  tobacco use, alcohol use, type 2 diabetes, BPH who presents due to concerns of left inguinal hernia that has been becoming more symptomatic over the last few months.  Patient reports hernia has been present for years, however has been recurring more frequently despite reduction.  He denies significant pain at hernia site and reports he is always able to reduce it.  He was seen by his urologist who had concern for possible bladder involvement and large left inguinal hernia prompting him to present for evaluation today.  He denies fever, chills, constipation, nausea, vomiting.  No prior abdominal surgeries.    Past medical history and past surgical history reviewed with patient and with epic documentation.  Current medications reviewed in epic and with patient at bedside  Labs from 9/10/2024 reviewed with patient at bedside    History obtained from : patient  Review of Systems   Constitutional:  Negative for activity change and appetite change.   HENT:  Negative for congestion.    Respiratory:  Negative for shortness of breath.    Cardiovascular:  Negative for chest pain.   Gastrointestinal:  Negative for abdominal distention, abdominal pain, nausea and vomiting.   Genitourinary:  Positive for decreased urine volume and difficulty urinating.   Neurological:  Negative for weakness.   Psychiatric/Behavioral:  Negative for agitation, behavioral problems and confusion.    All other systems reviewed and are negative.    Past Medical History   Past Medical History:   Diagnosis Date    Dental disease years    Depression     Diabetes mellitus (HCC)     Dizziness weeks    Ear problems years    HL (hearing loss) months    Hypertension     Nasal congestion months    Sleep difficulties months    Tinnitus months     Past Surgical History:   Procedure Laterality Date    IR SPINE AND PAIN PROCEDURE  9/17/2024     Family History   Problem Relation Age of Onset    Diabetes Mother     Hypertension Mother     Hypertension Father      "Heart disease Father      Current Outpatient Medications on File Prior to Visit   Medication Sig Dispense Refill    amLODIPine (NORVASC) 10 mg tablet 1 tablet every morning      Aspirin Buf,CaCarb-MgCarb-MgO, 81 MG TABS Take 1 tablet by mouth daily      atorvastatin (LIPITOR) 40 mg tablet 1 tablet every morning      fenofibrate (TRICOR) 145 mg tablet 1 tablet every morning      gabapentin (NEURONTIN) 300 mg capsule Take 1 capsule (300 mg total) by mouth 3 (three) times a day 90 capsule 2    hydrOXYzine HCL (ATARAX) 25 mg tablet Take 0.5 tablets by mouth 2 (two) times a day as needed      losartan (COZAAR) 50 mg tablet Take 1 and 1/2 (one and one-half) tablet daily.      Melatonin 5 MG TBDP Take 1 tablet by mouth daily at bedtime (Patient not taking: Reported on 7/24/2024)      metFORMIN (GLUCOPHAGE-XR) 500 mg 24 hr tablet 1 tablet every morning      tamsulosin (FLOMAX) 0.4 mg 1 capsule every morning       No current facility-administered medications on file prior to visit.     Allergies   Allergen Reactions    Niacin Other (See Comments)          Objective     /72 (BP Location: Left arm, Patient Position: Sitting, Cuff Size: Standard)   Pulse 84   Temp 97.9 °F (36.6 °C) (Temporal)   Ht 5' 10.5\" (1.791 m)   Wt 88 kg (194 lb)   SpO2 95%   BMI 27.44 kg/m²     Physical Exam  Vitals and nursing note reviewed.   Constitutional:       General: He is not in acute distress.     Appearance: Normal appearance. He is not ill-appearing.   HENT:      Head: Normocephalic and atraumatic.      Mouth/Throat:      Mouth: Mucous membranes are moist.      Pharynx: Oropharynx is clear.   Eyes:      Extraocular Movements: Extraocular movements intact.   Cardiovascular:      Rate and Rhythm: Normal rate and regular rhythm.   Pulmonary:      Effort: Pulmonary effort is normal.   Abdominal:      General: Abdomen is flat. There is no distension.      Palpations: Abdomen is soft.      Tenderness: There is no abdominal tenderness. " There is no guarding or rebound.      Comments: Left groin however no reproducible hernia with cough or Valsalva.  No defect appreciated.   Musculoskeletal:         General: Normal range of motion.      Cervical back: Neck supple.   Skin:     General: Skin is warm and dry.      Coloration: Skin is not jaundiced.   Neurological:      General: No focal deficit present.      Mental Status: He is alert and oriented to person, place, and time. Mental status is at baseline.   Psychiatric:         Mood and Affect: Mood normal.         Behavior: Behavior normal.         Thought Content: Thought content normal.       Administrative Statements   I have spent a total time of 45 minutes in caring for this patient on the day of the visit/encounter including Diagnostic results, Prognosis, Risks and benefits of tx options, Risk factor reductions, Impressions, Counseling / Coordination of care, Documenting in the medical record, Reviewing / ordering tests, medicine, procedures  , and Obtaining or reviewing history  .

## 2024-10-03 ENCOUNTER — PREP FOR PROCEDURE (OUTPATIENT)
Dept: PAIN MEDICINE | Facility: CLINIC | Age: 74
End: 2024-10-03

## 2024-10-03 ENCOUNTER — OFFICE VISIT (OUTPATIENT)
Dept: PAIN MEDICINE | Facility: CLINIC | Age: 74
End: 2024-10-03
Payer: COMMERCIAL

## 2024-10-03 VITALS
BODY MASS INDEX: 28 KG/M2 | TEMPERATURE: 98.3 F | HEART RATE: 85 BPM | DIASTOLIC BLOOD PRESSURE: 68 MMHG | SYSTOLIC BLOOD PRESSURE: 132 MMHG | RESPIRATION RATE: 18 BRPM | HEIGHT: 71 IN | WEIGHT: 200 LBS | OXYGEN SATURATION: 98 %

## 2024-10-03 DIAGNOSIS — M47.26 OTHER SPONDYLOSIS WITH RADICULOPATHY, LUMBAR REGION: Primary | ICD-10-CM

## 2024-10-03 DIAGNOSIS — M54.16 LUMBAR RADICULOPATHY: Primary | ICD-10-CM

## 2024-10-03 PROCEDURE — G2211 COMPLEX E/M VISIT ADD ON: HCPCS | Performed by: ANESTHESIOLOGY

## 2024-10-03 PROCEDURE — 99214 OFFICE O/P EST MOD 30 MIN: CPT | Performed by: ANESTHESIOLOGY

## 2024-10-03 RX ORDER — GABAPENTIN 600 MG/1
600 TABLET ORAL 3 TIMES DAILY
Qty: 90 TABLET | Refills: 2 | Status: SHIPPED | OUTPATIENT
Start: 2024-10-03

## 2024-10-03 NOTE — H&P (VIEW-ONLY)
Assessment  1. Other spondylosis with radiculopathy, lumbar region  -     gabapentin (Neurontin) 600 MG tablet; Take 1 tablet (600 mg total) by mouth 3 (three) times a day    Greater than 90% relief of pain with improved ability to participate with IADLs after L5-S1 ILESI albeit for only 5 days before return of pain. Describes more axial low back pain with arthritic features and no significant radicular features at this time. MRI extensively reviewed with patient showing degenerative disc disease worst at the L4-5 level where there is diffuse annular bulging in addition to a 1 cm synovial cyst arising from the right facet joint resulting in moderate tricompartmental canal stenosis and moderate left foraminal narrowing. Previously reported the following symptomatology:     Right-sided lumbar radicular pain in the L5 and S1 dermatomal distribution accompanied by pain limited weakness numbness and paresthesias.  Patient has not yet participated with PT. Chronic pain with decreased participation with IADLs over the past 3 months.  Has been taking OTC ibuprofen and tylenol with modest benefit.  5/5 strength bilaterally, positive SLR right-sided. Reflexes 2+.  Additionally there is positive facet loading, R>L. Denies any gait instability, saddle anesthesia. On xray imaging Vertebral body heights are well-preserved. Disc space narrowing and osteophyte formation throughout the  lumbar spine. Mild facet hypertrophy.  Risks, benefits alternatives epidural steroid injections thoroughly discussed with patient.  Handouts provided questions answered to patient's satisfaction.  Lifestyle modifications extensively discussed including diet, exercise and weight loss in addition to core strengthening and smoking cessation.  Will proceed with multimodal pain therapy plan as noted below:    Plan  -ILESI L4-L5 (possible facet joint cyst aspiration); f/u 2 weeks post procedure  -gabapentin increased to 600mg TID for patient; counseled  regarding sedative effects of taking this medication and provided up titration calendar.  Counseled not to take medication while driving or operating heavy machinery/using stairs  -script provided for formal physical therapy for right-sided lumbar radiculopathy; Physician directed home exercise plan as per [employer] demonstrated and handouts provided that patient plans to participate with for 1 hour, twice a week for the next 6 weeks.     There are risks associated with opioid medications, including dependence, addiction and tolerance. The patient understands and agrees to use these medications only as prescribed. Potential side effects of the medications include, but are not limited to, constipation, drowsiness, addiction, impaired judgment and risk of fatal overdose if not taken as prescribed. The patient was warned against driving while taking sedation medications or operating heavy machinery. The patient voiced understanding. Sharing medications is a felony. At this point in time, the patient is showing no signs of addiction, abuse, diversion or suicidal ideation.     [place] Prescription Drug Monitoring Program report was reviewed and was appropriate      Complete risks and benefits including bleeding, infection, tissue reaction, nerve injury and allergic reaction were discussed. The approach was demonstrated using models and literature was provided. Verbal and written consent was obtained.     My impressions and treatment recommendations were discussed in detail with the patient who verbalized understanding and had no further questions.  Discharge instructions were provided. I personally saw and examined the patient and I agree with the above discussed plan of care.    Review of external notes including PT notes, PCP notes and specialist notes was performed at this visit in addition to review of new ordered imaging and past imaging to develop or modify multidisciplinary pain plan    No orders of the defined  types were placed in this encounter.      History of Present Illness    Greater than 90% relief of pain with improved ability to participate with IADLs after L5-S1 ILESI albeit for only 5 days before return of pain. Describes more axial low back pain with arthritic features and no significant radicular features at this time. Previously reported the following symptomatology:     Geo Cates is a 74 y.o. male with pmhx of DM-2 (NIDDM) HgbA1c 5.9%, HTN, COPD (smoker), CKD-2 presenting with chronic lumbar radicular pain in the right L5 and S1 dermatomal distributions. Debilitating pain limited weakness numbness and paresthesias accompany the pain. The patient rates the pain at a 8-9/10 accompanied by electric shock-like shooting features and crampy burning pain in the aforementioned dermatomal distributions.  The pain is worse in the mornings as well as the end of the day; exertion such as walking for long periods of time seems to exacerbate the pain.  The patient can hardly walk more than a few blocks without having debilitating pain.  He tries to maintain an active lifestyle and finds that the current degree of pain seems to compromises his efforts.  The pain significantly impacts independent activities of daily living and contributes to significant disability.  He has not yet attempted physical therapy formally.  He has taken nsaids, tylenol with limited relief of the pain as well.  He has never tried epidural steroid injections in the past. He denies any bowel or bladder dysfunction/incontinence, saddle anesthesia or gait instability.     I have personally reviewed and/or updated the patient's past medical history, past surgical history, family history, social history, current medications, allergies, and vital signs today.     Review of Systems   Constitutional:  Positive for activity change.   HENT: Negative.     Eyes: Negative.    Respiratory: Negative.     Cardiovascular: Negative.    Gastrointestinal: Negative.     Endocrine: Negative.    Genitourinary: Negative.    Musculoskeletal:  Positive for arthralgias, back pain, gait problem and myalgias.   Skin: Negative.    Allergic/Immunologic: Negative.    Neurological:  Positive for weakness and numbness.   Hematological: Negative.    Psychiatric/Behavioral: Negative.     All other systems reviewed and are negative.      Patient Active Problem List   Diagnosis    Recurrent major depressive disorder, in partial remission (HCC)    Peripheral vascular disease (HCC)    Type 2 diabetes mellitus with hemoglobin A1c goal of less than 7.0% (HCC)    Left inguinal hernia    Tobacco use       Past Medical History:   Diagnosis Date    Dental disease years    Depression     Diabetes mellitus (HCC)     Dizziness weeks    Ear problems years    HL (hearing loss) months    Hypertension     Nasal congestion months    Sleep difficulties months    Tinnitus months       Past Surgical History:   Procedure Laterality Date    IR SPINE AND PAIN PROCEDURE  9/17/2024       Family History   Problem Relation Age of Onset    Diabetes Mother     Hypertension Mother     Hypertension Father     Heart disease Father        Social History     Occupational History    Not on file   Tobacco Use    Smoking status: Every Day     Current packs/day: 1.00     Average packs/day: 1 pack/day for 35.0 years (35.0 ttl pk-yrs)     Types: Cigarettes    Smokeless tobacco: Never   Vaping Use    Vaping status: Never Used   Substance and Sexual Activity    Alcohol use: Yes     Alcohol/week: 2.0 standard drinks of alcohol     Types: 2 Cans of beer per week    Drug use: Not Currently    Sexual activity: Not Currently       Current Outpatient Medications on File Prior to Visit   Medication Sig    amLODIPine (NORVASC) 10 mg tablet 1 tablet every morning    Aspirin Buf,CaCarb-MgCarb-MgO, 81 MG TABS Take 1 tablet by mouth daily    atorvastatin (LIPITOR) 40 mg tablet 1 tablet every morning    fenofibrate (TRICOR) 145 mg tablet 1 tablet  "every morning    gabapentin (NEURONTIN) 300 mg capsule Take 1 capsule (300 mg total) by mouth 3 (three) times a day    hydrOXYzine HCL (ATARAX) 25 mg tablet Take 0.5 tablets by mouth 2 (two) times a day as needed    losartan (COZAAR) 50 mg tablet Take 1 and 1/2 (one and one-half) tablet daily.    metFORMIN (GLUCOPHAGE-XR) 500 mg 24 hr tablet 1 tablet every morning    tamsulosin (FLOMAX) 0.4 mg 1 capsule every morning    Melatonin 5 MG TBDP Take 1 tablet by mouth daily at bedtime (Patient not taking: Reported on 7/24/2024)     No current facility-administered medications on file prior to visit.       Allergies   Allergen Reactions    Niacin Other (See Comments)         Physical Exam    /68 (BP Location: Left arm, Patient Position: Sitting, Cuff Size: Adult)   Pulse 85   Temp 98.3 °F (36.8 °C)   Resp 18   Ht 5' 10.5\" (1.791 m)   Wt 90.7 kg (200 lb)   SpO2 98%   BMI 28.29 kg/m²     Constitutional: normal, well developed, well nourished, alert, in no distress and non-toxic and no overt pain behavior. and overweight  Eyes: anicteric  HEENT: grossly intact  Neck: supple, symmetric, trachea midline and no masses   Pulmonary:even and unlabored  Cardiovascular:No edema or pitting edema present  Skin:Normal without rashes or lesions and well hydrated  Psychiatric:Mood and affect appropriate  Neurologic:Cranial Nerves II-XII grossly intact Sensation grossly intact; no clonus negative malhotra's. Reflexes 2+ and brisk. SLR positive right sided  Musculoskeletal:normal gait. 5/5 strength bilaterally with AROM in lower extremities. Difficulty with normal heel toe and tip toe walking. Signifiicant pain with lumbar facet loading bilaterally and with lateral spine rotation, ttp over lumbar paraspinal muscles. Negative jaja's test, negative gaenslen's negative SIJ loading bilaterally.    Imaging    MRI LUMBAR SPINE WITHOUT CONTRAST     INDICATION: M54.16: Radiculopathy, lumbar region.     COMPARISON: X-rays dated " 7/11/2024     TECHNIQUE:  Multiplanar, multisequence imaging of the lumbar spine was performed. .        IMAGE QUALITY:  Diagnostic     FINDINGS:     VERTEBRAL BODIES:  There are 5 lumbar type vertebral bodies.  Normal alignment of the lumbar spine.  No spondylolysis or spondylolisthesis. No scoliosis.  No compression fracture.    Normal marrow signal is identified within the visualized bony   structures.  No discrete marrow lesion.     SACRUM:  Normal signal within the sacrum. No evidence of insufficiency or stress fracture.     DISTAL CORD AND CONUS:  Normal size and signal within the distal cord and conus.     PARASPINAL SOFT TISSUES:  Paraspinal soft tissues are unremarkable.     LOWER THORACIC DISC SPACES:  Normal disc height and signal.  No disc herniation, canal stenosis or foraminal narrowing.     LUMBAR DISC SPACES:     L1-L2:  Normal.     L2-L3: Minimal annular bulging asymmetric towards the left without discrete disc herniation, canal stenosis or foraminal nerve impingement despite mild left-sided foraminal narrowing.     L3-L4: Mild circumferential annular bulging. Disc herniation. No canal stenosis. Mild foraminal narrowing without foraminal     L4-L5: Normal disc height. Moderate circumferential annular bulge. No focal disc herniation. Mild bilateral facet arthropathy and ligamentum flavum thickening. There is a well-circumscribed cystic structure within the right posterior lateral epidural   space medial to the facet joint measuring 1 cm 4.5 mm consistent with synovial cyst. These changes result in moderate tricompartmental canal stenosis in particular distorting the right posterior lateral aspect of the thecal sac. There is moderate left   and mild right foraminal narrowing identified.     L5-S1: Mild circumferential annular bulging and facet arthropathy. No disc herniation. No canal stenosis. Moderate bilateral foraminal narrowing with mild mass effect upon both nerves.     OTHER FINDINGS:  None.      IMPRESSION:     Number degenerative disc disease worst at the L4-5 level where there is diffuse annular bulging in addition to a 1 cm synovial cyst arising from the right facet joint resulting in moderate tricompartmental canal stenosis and moderate left foraminal   narrowing.     Additional mild canal stenosis and foraminal narrowing at L2-3 and L3-4 and moderate bilateral foraminal narrowing at L1 5 S1.

## 2024-10-03 NOTE — PATIENT INSTRUCTIONS
Patient Education     Core Strengthening Exercises on Back or on Hands and Knees   About this topic   Your core muscles are in your chest, back, buttock, and stomach area. They are your abdominal, back, and pelvis muscles. These muscles help keep your body stable when using your arms or legs. They also help with balance and posture. There are many exercises you can do to keep these muscles strong.  If you have back problems like a compression fracture or a ruptured disc, doing some of these exercises could make your problem worse. Some of these exercises may cause lower back pain.  General   Before starting with a program, ask your doctor if you are healthy enough to do these exercises. Your doctor may have you work with a , chiropractor, or physical therapist to make a safe exercise program to meet your needs.  Strengthening Exercises   Strengthening exercises keep your muscles firm and strong. Start by repeating each exercise 2 to 3 times. Work up to doing each exercise 10 times. Try to do the exercises 2 to 3 times each day. Hold each exercise for 3 to 5 seconds. Do all exercises slowly.  Hip lifts ? Lie on your back with your knees bent and feet flat on the floor. Tighten your stomach muscles and push your heels into the floor to lift your buttocks off the floor. Relax.  Pelvic tilts ? Lie on your back with your knees bent and feet flat on the floor. Tighten your stomach muscles and press your lower back down to the floor. Relax.  Straight leg raises lying down ? Lie on your back with one leg straight. Bend your other knee so the foot is flat on the bed. Keeping your leg straight, lift the leg up to the level of your other knee. Lower it back down. Repeat with the other leg.  Knee flex lying down ? Lie on your back with both knees bent and your feet flat on the floor. Tighten your belly muscles. Raise one leg up and back down as if you are marching in slow motion. Keep belly muscles tight while you move  your leg. Switch legs. To make this exercise harder, raise both arms straight up in the air. Tighten your belly muscles. When you raise one leg up, reach the opposite arm over your head. Switch, moving the opposite arm and leg until you have done 10 repetitions on each side.  Abdominal crunches ? Lie on your back with both knees bent. Keep your feet flat on the floor. Place your hands in one of these positions. Try starting with the first position since it is the easiest. As you get better, use the other positions to make it harder.  Crunches with arms at sides.  Crunches with arms across chest.  Crunches with arms behind head. Be careful not to interlock your fingers behind your neck or head while doing crunches. This may add tension to your neck and cause strain.  Look at the ceiling. Tighten your belly muscles and lift your shoulders and upper back off the floor. Breathe out while you are doing this. Lower your shoulders to the floor. Breathe in while you are doing this. Relax your belly muscles all the way before starting another crunch.  Arm and leg lifts on hands and knees ? Start on your hands and knees. With all of these exercises, keep your back as level as possible. If you are having trouble with this, you may want to put a small object on your back such as a book. If it falls off, you are not keeping your back level enough during the exercise.  Lift one arm up to shoulder level and hold. Lower it back down. Now, lift up the other arm and hold.  Lift one leg up and kick it straight out until it is in line with your back and hold. Lower it back down. Now, lift up the other leg and hold.  Lift one arm and the OPPOSITE leg up at the same time and hold. Lower them down. Now, repeat using the other arm and leg. This is a very hard exercise. It may take time to be able to do this.               What will the results be?   Stronger core  Better balance  More toned belly and back muscles  Easier to do daily  activities  Better sports performance  Less low back pain  Helpful tips   Stay active and work out to keep your muscles strong and flexible.  Keep a healthy weight to avoid putting too much stress on your spine. Eat a healthy diet to keep your muscles healthy.  Be sure you do not hold your breath when exercising. This can raise your blood pressure. If you tend to hold your breath, try counting out loud when exercising. If any exercise bothers you, stop right away.  Try walking or cycling at an easy pace for a few minutes to warm up your muscles. Do this again after exercising.  Exercise may be slightly uncomfortable, but you should not have sharp pains. If you do get sharp pains, stop what you are doing. If the sharp pains continue, call your doctor.  Last Reviewed Date   2021-03-18  Consumer Information Use and Disclaimer   This generalized information is a limited summary of diagnosis, treatment, and/or medication information. It is not meant to be comprehensive and should be used as a tool to help the user understand and/or assess potential diagnostic and treatment options. It does NOT include all information about conditions, treatments, medications, side effects, or risks that may apply to a specific patient. It is not intended to be medical advice or a substitute for the medical advice, diagnosis, or treatment of a health care provider based on the health care provider's examination and assessment of a patient’s specific and unique circumstances. Patients must speak with a health care provider for complete information about their health, medical questions, and treatment options, including any risks or benefits regarding use of medications. This information does not endorse any treatments or medications as safe, effective, or approved for treating a specific patient. [employer] and its affiliates disclaim any warranty or liability relating to this information or the use thereof. The use of this information  is governed by the Terms of Use, available at https://www.[employer].uwer.com/en/know/clinical-effectiveness-terms   Copyright   Copyright © 2024 [employer] and its affiliates and/or licensors. All rights reserved.

## 2024-10-04 ENCOUNTER — TELEPHONE (OUTPATIENT)
Age: 74
End: 2024-10-04

## 2024-10-04 ENCOUNTER — TELEPHONE (OUTPATIENT)
Dept: HEMATOLOGY ONCOLOGY | Facility: CLINIC | Age: 74
End: 2024-10-04

## 2024-10-04 NOTE — TELEPHONE ENCOUNTER
"Patient called to cx his scheduled hernia repair.  He stated \"it is not the right time for him to have this done\"  He was on for 10/15/24.  "

## 2024-10-04 NOTE — TELEPHONE ENCOUNTER
Patient called to cancel surgery and post op appointment. Cancelled post op and transferred patient to [place].

## 2024-10-22 ENCOUNTER — HOSPITAL ENCOUNTER (OUTPATIENT)
Dept: INTERVENTIONAL RADIOLOGY/VASCULAR | Facility: HOSPITAL | Age: 74
Discharge: HOME | End: 2024-10-22
Attending: ANESTHESIOLOGY
Payer: COMMERCIAL

## 2024-10-22 VITALS
BODY MASS INDEX: 28 KG/M2 | SYSTOLIC BLOOD PRESSURE: 156 MMHG | HEART RATE: 79 BPM | HEIGHT: 71 IN | WEIGHT: 200 LBS | TEMPERATURE: 98 F | OXYGEN SATURATION: 97 % | DIASTOLIC BLOOD PRESSURE: 72 MMHG | RESPIRATION RATE: 20 BRPM

## 2024-10-22 DIAGNOSIS — M54.16 LUMBAR RADICULOPATHY: ICD-10-CM

## 2024-10-22 LAB — GLUCOSE SERPL-MCNC: 118 MG/DL (ref 65–140)

## 2024-10-22 PROCEDURE — 82948 REAGENT STRIP/BLOOD GLUCOSE: CPT

## 2024-10-22 PROCEDURE — 62323 NJX INTERLAMINAR LMBR/SAC: CPT | Performed by: ANESTHESIOLOGY

## 2024-10-22 RX ORDER — LIDOCAINE HYDROCHLORIDE 10 MG/ML
INJECTION, SOLUTION EPIDURAL; INFILTRATION; INTRACAUDAL; PERINEURAL AS NEEDED
Status: COMPLETED | OUTPATIENT
Start: 2024-10-22 | End: 2024-10-22

## 2024-10-22 RX ORDER — METHYLPREDNISOLONE ACETATE 80 MG/ML
INJECTION, SUSPENSION INTRA-ARTICULAR; INTRALESIONAL; INTRAMUSCULAR; PARENTERAL; SOFT TISSUE AS NEEDED
Status: COMPLETED | OUTPATIENT
Start: 2024-10-22 | End: 2024-10-22

## 2024-10-22 RX ORDER — 0.9 % SODIUM CHLORIDE 0.9 %
VIAL (ML) INJECTION AS NEEDED
Status: COMPLETED | OUTPATIENT
Start: 2024-10-22 | End: 2024-10-22

## 2024-10-22 RX ADMIN — IOHEXOL 1 ML: 240 INJECTION, SOLUTION INTRATHECAL; INTRAVASCULAR; INTRAVENOUS; ORAL at 12:40

## 2024-10-22 RX ADMIN — SODIUM CHLORIDE 3 ML: 9 INJECTION, SOLUTION INTRAMUSCULAR; INTRAVENOUS; SUBCUTANEOUS at 12:40

## 2024-10-22 RX ADMIN — METHYLPREDNISOLONE ACETATE 80 MG: 80 INJECTION, SUSPENSION INTRA-ARTICULAR; INTRALESIONAL; INTRAMUSCULAR; SOFT TISSUE at 12:40

## 2024-10-22 RX ADMIN — LIDOCAINE HYDROCHLORIDE 5 ML: 10 INJECTION, SOLUTION EPIDURAL; INFILTRATION; INTRACAUDAL; PERINEURAL at 12:40

## 2024-10-22 NOTE — DISCHARGE INSTR - AVS FIRST PAGE
YOUR 2 WEEK FOLLOW UP HAS BEEN SCHEDULED; IF YOU WISH TO CHANGE THE FOLLOW UP, PLEASE CALL THE [facility] AT [place]: [phone]    EPIDURAL STEROID INJECTION DISCHARGE INSTRUCTIONS  WHAT YOU NEED TO KNOW:   An epidural steroid injection (ESTRELLITA) is a procedure to inject steroid medicine into the epidural space. The epidural space is between your spinal cord and vertebrae. Steroids reduce inflammation and fluid buildup in your spine that may be causing pain. You may be given pain medicine along with the steroids.        DISCHARGE INSTRUCTIONS:   Call your local emergency number (911 in the [place]) if:   You have a seizure.    You have trouble moving your legs.    Seek care immediately if:   Blood soaks through your bandage.    You have a fever or chills, severe back pain, and the procedure area is sensitive to the touch.    You cannot control when you urinate or have a bowel movement.    Call your doctor if:   You have weakness or numbness in your legs.    Your wound is red, swollen, or draining pus.    You have nausea or are vomiting.    Your face or neck is red and you feel warm.    You have more pain than you had before the procedure.    You have swelling in your hands or feet.    You have questions or concerns about your condition or care.    Care for your wound as directed:  You may remove the bandage before you go to bed the day of your procedure. You may take a shower, but do not take a bath for at least 24 hours.   Self-care:   Do not drive,  use machines, or do strenuous activity for 24 hours after your procedure or as directed.     Continue other treatments  as directed. Steroid injections alone will not control your pain. The injections are meant to be used with other treatments, such as physical therapy.    Follow up with your doctor as directed:  Write down your questions so you remember to ask them during your visits.           ACTIVITY  Do not drive or operate machinery today.  No strenuous  activity today - bending, lifting, etc.   You may resume normal activities starting tomorrow - start slowly and as tolerated.  You may shower today, but not tub baths or hot tubs.  You may have numbness for several hours from the local anesthetics. Please use caution and common sense, especially with weight-bearing activities.    CARE OF THE INJECTION SITE  If you have soreness or pain apply ice to the area today (20 minutes on and 20 minutes off).  Starting tomorrow, you may resume normal activities  Notify the [facility] if you have any of the following: redness, drainage, swelling or fever above 100°F.      MEDICATIONS  Continue to take all routine medications.  Our office may have instructed you to hold some medications. You may restart medications, including blood thinners.

## 2024-10-22 NOTE — INTERVAL H&P NOTE
H&P reviewed. After examining the patient I find no changes in the patients condition since the H&P had been written.    Vitals:    10/22/24 1142   BP: 125/73   Pulse: 83   Resp: 22   Temp: 97.8 °F (36.6 °C)   SpO2: 97%

## 2024-11-06 RX ORDER — ATORVASTATIN CALCIUM 80 MG/1
80 TABLET, FILM COATED ORAL
COMMUNITY
Start: 2024-10-14

## 2024-11-07 ENCOUNTER — PREP FOR PROCEDURE (OUTPATIENT)
Dept: PAIN MEDICINE | Facility: CLINIC | Age: 74
End: 2024-11-07

## 2024-11-07 ENCOUNTER — OFFICE VISIT (OUTPATIENT)
Dept: PAIN MEDICINE | Facility: CLINIC | Age: 74
End: 2024-11-07
Payer: COMMERCIAL

## 2024-11-07 VITALS
TEMPERATURE: 98.6 F | SYSTOLIC BLOOD PRESSURE: 140 MMHG | BODY MASS INDEX: 28 KG/M2 | DIASTOLIC BLOOD PRESSURE: 78 MMHG | OXYGEN SATURATION: 96 % | RESPIRATION RATE: 20 BRPM | HEIGHT: 71 IN | WEIGHT: 200 LBS | HEART RATE: 90 BPM

## 2024-11-07 DIAGNOSIS — M47.816 LUMBAR SPONDYLOSIS: Primary | ICD-10-CM

## 2024-11-07 PROCEDURE — G2211 COMPLEX E/M VISIT ADD ON: HCPCS | Performed by: ANESTHESIOLOGY

## 2024-11-07 PROCEDURE — 99214 OFFICE O/P EST MOD 30 MIN: CPT | Performed by: ANESTHESIOLOGY

## 2024-11-07 NOTE — PROGRESS NOTES
Assessment  1. Lumbar spondylosis    Axial low back pain described primarily by arthritic features.  Aching, nagging, indolent, stabbing, throbbing features in axial low back without radicular components.  5/5 strength bilaterally in lower extremities, negative SLR.  Positive facet loading maneuvers in lumbar spine elicited pain, positive tenderness to palpation over lumbar paraspinal muscles.  Findings correlate with prominent lumbar facet arthropathy seen throughout axial low back on MRI imaging studies.  Currently he is neurologically intact without gait instability, saddle anesthesia or bowel/bladder abnormality. Risks, benefits and alternatives to medial branch blocks and subsequent radiofrequency ablation if successful thoroughly discussed with patient.  Handouts provided questions answered to patient satisfaction. Lifestyle modifications extensively discussed including diet, exercise and weight loss in addition to core strengthening and smoking cessation.  Will proceed with multimodal pain therapy plan as noted below:    The patient has been experiencing moderate to severe axial spine pain that is causing functional deficit.  The pain has been present for at least 3 months and is not improving with conservative care.  Currently the patient is not experiencing any radicular features nor neurogenic claudication.  Non-facet pathology has been ruled out on clinical evaluation.        Plan  -bilateral L3, L4, L5 medial branch blocks #1; f/u 2 weeks post procedure  -gabapentin 600mg TID for patient; counseled regarding sedative effects of taking this medication and provided up titration calendar.  Counseled not to take medication while driving or operating heavy machinery/using stairs  -has been participant with formal physical therapy for lumbar spondylosis; Physician directed home exercise plan as per [employer] demonstrated and handouts provided that patient plans to participate with for 1 hour, twice a week for  the next 6 weeks.     There are risks associated with opioid medications, including dependence, addiction and tolerance. The patient understands and agrees to use these medications only as prescribed. Potential side effects of the medications include, but are not limited to, constipation, drowsiness, addiction, impaired judgment and risk of fatal overdose if not taken as prescribed. The patient was warned against driving while taking sedation medications or operating heavy machinery. The patient voiced understanding. Sharing medications is a felony. At this point in time, the patient is showing no signs of addiction, abuse, diversion or suicidal ideation.     [place] Prescription Drug Monitoring Program report was reviewed and was appropriate      Complete risks and benefits including bleeding, infection, tissue reaction, nerve injury and allergic reaction were discussed. The approach was demonstrated using models and literature was provided. Verbal and written consent was obtained.     My impressions and treatment recommendations were discussed in detail with the patient who verbalized understanding and had no further questions.  Discharge instructions were provided. I personally saw and examined the patient and I agree with the above discussed plan of care.    Review of external notes including PT notes, PCP notes and specialist notes was performed at this visit in addition to review of new ordered imaging and past imaging to develop or modify multidisciplinary pain plan    New Medications Ordered This Visit   Medications    atorvastatin (LIPITOR) 80 mg tablet     Sig: Take 80 mg by mouth daily at bedtime       History of Present Illness    Geo Cates is a 74 y.o. male with pmhx of DM-2 (NIDDM) HgbA1c 5.9%, HTN, COPD (smoker), CKD-2 presenting with chronic    low back pain described primarily as arthritic in nature.  He describes 8-9/10 low back pain that is worse in the mornings and worse at the end of the  day.  The pain is characterized by achy, nagging, indolent, crampy, stabbing pain in his axial low back.  The patient describes that the pain is worse with standing for long periods of time on hard surfaces as well as with walking.  The patient is a very active individual and feels as though this pain compromises his participation with independent activities of daily living. The pain can be debilitating at times and contribute to significant disability, compromising overall activity and independent activities of daily living.  He has tried physical therapy with limited relief of symptoms.  Medications the patient has tried in the past include nsaids, tylenol and gabapentin with modest relief.  He describes no radicular symptoms and has good strength.  He denies any weakness numbness or paresthesias. The patient denies any bowel or bladder dysfunction, saddle anesthesia or gait instability as well.       I have personally reviewed and/or updated the patient's past medical history, past surgical history, family history, social history, current medications, allergies, and vital signs today.     Review of Systems   Constitutional:  Positive for activity change.   HENT: Negative.     Eyes: Negative.    Respiratory: Negative.     Cardiovascular: Negative.    Gastrointestinal: Negative.    Endocrine: Negative.    Genitourinary: Negative.    Musculoskeletal:  Positive for arthralgias, back pain, gait problem and myalgias.   Skin: Negative.    Allergic/Immunologic: Negative.    Neurological:  Positive for weakness and numbness.   Hematological: Negative.    Psychiatric/Behavioral: Negative.     All other systems reviewed and are negative.      Patient Active Problem List   Diagnosis    Recurrent major depressive disorder, in partial remission (HCC)    Peripheral vascular disease (HCC)    Type 2 diabetes mellitus with hemoglobin A1c goal of less than 7.0% (HCC)    Left inguinal hernia    Tobacco use    Other spondylosis with  radiculopathy, lumbar region       Past Medical History:   Diagnosis Date    Dental disease years    Depression     Diabetes mellitus (HCC)     Dizziness weeks    Ear problems years    HL (hearing loss) months    Hypertension     Nasal congestion months    Sleep difficulties months    Tinnitus months       Past Surgical History:   Procedure Laterality Date    IR SPINE AND PAIN PROCEDURE  9/17/2024    IR SPINE AND PAIN PROCEDURE  10/22/2024       Family History   Problem Relation Age of Onset    Diabetes Mother     Hypertension Mother     Hypertension Father     Heart disease Father        Social History     Occupational History    Not on file   Tobacco Use    Smoking status: Every Day     Current packs/day: 1.00     Average packs/day: 1 pack/day for 35.0 years (35.0 ttl pk-yrs)     Types: Cigarettes    Smokeless tobacco: Never   Vaping Use    Vaping status: Never Used   Substance and Sexual Activity    Alcohol use: Yes     Alcohol/week: 2.0 standard drinks of alcohol     Types: 2 Cans of beer per week    Drug use: Not Currently    Sexual activity: Not Currently       Current Outpatient Medications on File Prior to Visit   Medication Sig    amLODIPine (NORVASC) 10 mg tablet 1 tablet every morning    Aspirin Buf,CaCarb-MgCarb-MgO, 81 MG TABS Take 1 tablet by mouth daily    atorvastatin (LIPITOR) 80 mg tablet Take 80 mg by mouth daily at bedtime    fenofibrate (TRICOR) 145 mg tablet 1 tablet every morning    gabapentin (Neurontin) 600 MG tablet Take 1 tablet (600 mg total) by mouth 3 (three) times a day    hydrOXYzine HCL (ATARAX) 25 mg tablet Take 0.5 tablets by mouth 2 (two) times a day as needed    losartan (COZAAR) 50 mg tablet Take 1 and 1/2 (one and one-half) tablet daily.    Melatonin 5 MG TBDP Take 1 tablet by mouth daily at bedtime As needed    metFORMIN (GLUCOPHAGE-XR) 500 mg 24 hr tablet 1 tablet every morning    tamsulosin (FLOMAX) 0.4 mg 1 capsule every morning    atorvastatin (LIPITOR) 40 mg tablet 1  "tablet every morning (Patient not taking: Reported on 11/7/2024)     No current facility-administered medications on file prior to visit.       Allergies   Allergen Reactions    Niacin Other (See Comments)         Physical Exam    /78 (BP Location: Left arm, Patient Position: Sitting, Cuff Size: Adult)   Pulse 90   Temp 98.6 °F (37 °C)   Resp 20   Ht 5' 10.5\" (1.791 m)   Wt 90.7 kg (200 lb)   SpO2 96%   BMI 28.29 kg/m²     Constitutional: normal, well developed, well nourished, alert, in no distress and non-toxic and no overt pain behavior. and overweight  Eyes: anicteric  HEENT: grossly intact  Neck: supple, symmetric, trachea midline and no masses   Pulmonary:even and unlabored  Cardiovascular:No edema or pitting edema present  Skin:Normal without rashes or lesions and well hydrated  Psychiatric:Mood and affect appropriate  Neurologic:Cranial Nerves II-XII grossly intact Sensation grossly intact; no clonus negative malhotra's. Reflexes 2+ and brisk.   Musculoskeletal:normal gait. 5/5 strength bilaterally with AROM in lower extremities. Difficulty with normal heel toe and tip toe walking. Signifiicant pain with lumbar facet loading bilaterally and with lateral spine rotation, ttp over lumbar paraspinal muscles. Negative jaja's test, negative gaenslen's negative SIJ loading bilaterally.    Imaging    MRI LUMBAR SPINE WITHOUT CONTRAST     INDICATION: M54.16: Radiculopathy, lumbar region.     COMPARISON: X-rays dated 7/11/2024     TECHNIQUE:  Multiplanar, multisequence imaging of the lumbar spine was performed. .        IMAGE QUALITY:  Diagnostic     FINDINGS:     VERTEBRAL BODIES:  There are 5 lumbar type vertebral bodies.  Normal alignment of the lumbar spine.  No spondylolysis or spondylolisthesis. No scoliosis.  No compression fracture.    Normal marrow signal is identified within the visualized bony   structures.  No discrete marrow lesion.     SACRUM:  Normal signal within the sacrum. No evidence of " insufficiency or stress fracture.     DISTAL CORD AND CONUS:  Normal size and signal within the distal cord and conus.     PARASPINAL SOFT TISSUES:  Paraspinal soft tissues are unremarkable.     LOWER THORACIC DISC SPACES:  Normal disc height and signal.  No disc herniation, canal stenosis or foraminal narrowing.     LUMBAR DISC SPACES:     L1-L2:  Normal.     L2-L3: Minimal annular bulging asymmetric towards the left without discrete disc herniation, canal stenosis or foraminal nerve impingement despite mild left-sided foraminal narrowing.     L3-L4: Mild circumferential annular bulging. Disc herniation. No canal stenosis. Mild foraminal narrowing without foraminal     L4-L5: Normal disc height. Moderate circumferential annular bulge. No focal disc herniation. Mild bilateral facet arthropathy and ligamentum flavum thickening. There is a well-circumscribed cystic structure within the right posterior lateral epidural   space medial to the facet joint measuring 1 cm 4.5 mm consistent with synovial cyst. These changes result in moderate tricompartmental canal stenosis in particular distorting the right posterior lateral aspect of the thecal sac. There is moderate left   and mild right foraminal narrowing identified.     L5-S1: Mild circumferential annular bulging and facet arthropathy. No disc herniation. No canal stenosis. Moderate bilateral foraminal narrowing with mild mass effect upon both nerves.     OTHER FINDINGS:  None.     IMPRESSION:     Number degenerative disc disease worst at the L4-5 level where there is diffuse annular bulging in addition to a 1 cm synovial cyst arising from the right facet joint resulting in moderate tricompartmental canal stenosis and moderate left foraminal   narrowing.     Additional mild canal stenosis and foraminal narrowing at L2-3 and L3-4 and moderate bilateral foraminal narrowing at L1 5 S1.

## 2024-11-08 NOTE — PATIENT INSTRUCTIONS
Patient Education     Core Strengthening Exercises on Back or on Hands and Knees   About this topic   Your core muscles are in your chest, back, buttock, and stomach area. They are your abdominal, back, and pelvis muscles. These muscles help keep your body stable when using your arms or legs. They also help with balance and posture. There are many exercises you can do to keep these muscles strong.  If you have back problems like a compression fracture or a ruptured disc, doing some of these exercises could make your problem worse. Some of these exercises may cause lower back pain.  General   Before starting with a program, ask your doctor if you are healthy enough to do these exercises. Your doctor may have you work with a , chiropractor, or physical therapist to make a safe exercise program to meet your needs.  Strengthening Exercises   Strengthening exercises keep your muscles firm and strong. Start by repeating each exercise 2 to 3 times. Work up to doing each exercise 10 times. Try to do the exercises 2 to 3 times each day. Hold each exercise for 3 to 5 seconds. Do all exercises slowly.  Hip lifts ? Lie on your back with your knees bent and feet flat on the floor. Tighten your stomach muscles and push your heels into the floor to lift your buttocks off the floor. Relax.  Pelvic tilts ? Lie on your back with your knees bent and feet flat on the floor. Tighten your stomach muscles and press your lower back down to the floor. Relax.  Straight leg raises lying down ? Lie on your back with one leg straight. Bend your other knee so the foot is flat on the bed. Keeping your leg straight, lift the leg up to the level of your other knee. Lower it back down. Repeat with the other leg.  Knee flex lying down ? Lie on your back with both knees bent and your feet flat on the floor. Tighten your belly muscles. Raise one leg up and back down as if you are marching in slow motion. Keep belly muscles tight while you move  your leg. Switch legs. To make this exercise harder, raise both arms straight up in the air. Tighten your belly muscles. When you raise one leg up, reach the opposite arm over your head. Switch, moving the opposite arm and leg until you have done 10 repetitions on each side.  Abdominal crunches ? Lie on your back with both knees bent. Keep your feet flat on the floor. Place your hands in one of these positions. Try starting with the first position since it is the easiest. As you get better, use the other positions to make it harder.  Crunches with arms at sides.  Crunches with arms across chest.  Crunches with arms behind head. Be careful not to interlock your fingers behind your neck or head while doing crunches. This may add tension to your neck and cause strain.  Look at the ceiling. Tighten your belly muscles and lift your shoulders and upper back off the floor. Breathe out while you are doing this. Lower your shoulders to the floor. Breathe in while you are doing this. Relax your belly muscles all the way before starting another crunch.  Arm and leg lifts on hands and knees ? Start on your hands and knees. With all of these exercises, keep your back as level as possible. If you are having trouble with this, you may want to put a small object on your back such as a book. If it falls off, you are not keeping your back level enough during the exercise.  Lift one arm up to shoulder level and hold. Lower it back down. Now, lift up the other arm and hold.  Lift one leg up and kick it straight out until it is in line with your back and hold. Lower it back down. Now, lift up the other leg and hold.  Lift one arm and the OPPOSITE leg up at the same time and hold. Lower them down. Now, repeat using the other arm and leg. This is a very hard exercise. It may take time to be able to do this.               What will the results be?   Stronger core  Better balance  More toned belly and back muscles  Easier to do daily  activities  Better sports performance  Less low back pain  Helpful tips   Stay active and work out to keep your muscles strong and flexible.  Keep a healthy weight to avoid putting too much stress on your spine. Eat a healthy diet to keep your muscles healthy.  Be sure you do not hold your breath when exercising. This can raise your blood pressure. If you tend to hold your breath, try counting out loud when exercising. If any exercise bothers you, stop right away.  Try walking or cycling at an easy pace for a few minutes to warm up your muscles. Do this again after exercising.  Exercise may be slightly uncomfortable, but you should not have sharp pains. If you do get sharp pains, stop what you are doing. If the sharp pains continue, call your doctor.  Last Reviewed Date   2021-03-18  Consumer Information Use and Disclaimer   This generalized information is a limited summary of diagnosis, treatment, and/or medication information. It is not meant to be comprehensive and should be used as a tool to help the user understand and/or assess potential diagnostic and treatment options. It does NOT include all information about conditions, treatments, medications, side effects, or risks that may apply to a specific patient. It is not intended to be medical advice or a substitute for the medical advice, diagnosis, or treatment of a health care provider based on the health care provider's examination and assessment of a patient’s specific and unique circumstances. Patients must speak with a health care provider for complete information about their health, medical questions, and treatment options, including any risks or benefits regarding use of medications. This information does not endorse any treatments or medications as safe, effective, or approved for treating a specific patient. [employer] and its affiliates disclaim any warranty or liability relating to this information or the use thereof. The use of this information  is governed by the Terms of Use, available at https://www.woltersYelllohuwer.com/en/know/clinical-effectiveness-terms   Copyright   Copyright © 2024 [employer] and its affiliates and/or licensors. All rights reserved.

## 2024-11-19 ENCOUNTER — TELEPHONE (OUTPATIENT)
Age: 74
End: 2024-11-19

## 2024-11-19 NOTE — TELEPHONE ENCOUNTER
Caller: david Guardado    Doctor: Emily    Reason for call: pt has procedure on 12/3 and wanted us to know that he went to an opthamologist yesterday and the dr prescribed 3 days worth of an oral antibiotic and eye salve that he has to use for about a week.    Pt's lower eye lid was curling in and the dr did a minor procedure    Can the pt still have his procedure on 12/3?  Please advise    Call back#: [phone]

## 2024-11-19 NOTE — TELEPHONE ENCOUNTER
S/W pt and advised having MBB and is not getting steroid  Ok to proceed  Did explain rationale behind antibiotics and steroid  CB with questions or concerns

## 2024-12-03 ENCOUNTER — HOSPITAL ENCOUNTER (OUTPATIENT)
Dept: INTERVENTIONAL RADIOLOGY/VASCULAR | Facility: HOSPITAL | Age: 74
Discharge: HOME | End: 2024-12-03
Attending: ANESTHESIOLOGY
Payer: COMMERCIAL

## 2024-12-03 VITALS
SYSTOLIC BLOOD PRESSURE: 142 MMHG | HEART RATE: 77 BPM | DIASTOLIC BLOOD PRESSURE: 63 MMHG | OXYGEN SATURATION: 98 % | TEMPERATURE: 97.8 F | RESPIRATION RATE: 15 BRPM

## 2024-12-03 DIAGNOSIS — M47.816 LUMBAR SPONDYLOSIS: ICD-10-CM

## 2024-12-03 LAB — GLUCOSE SERPL-MCNC: 120 MG/DL (ref 65–140)

## 2024-12-03 PROCEDURE — 64493 INJ PARAVERT F JNT L/S 1 LEV: CPT | Performed by: ANESTHESIOLOGY

## 2024-12-03 PROCEDURE — 64494 INJ PARAVERT F JNT L/S 2 LEV: CPT | Performed by: ANESTHESIOLOGY

## 2024-12-03 PROCEDURE — 82948 REAGENT STRIP/BLOOD GLUCOSE: CPT

## 2024-12-03 RX ORDER — BUPIVACAINE HYDROCHLORIDE 5 MG/ML
INJECTION, SOLUTION EPIDURAL; INTRACAUDAL AS NEEDED
Status: COMPLETED | OUTPATIENT
Start: 2024-12-03 | End: 2024-12-03

## 2024-12-03 RX ORDER — LIDOCAINE HYDROCHLORIDE 10 MG/ML
INJECTION, SOLUTION EPIDURAL; INFILTRATION; INTRACAUDAL; PERINEURAL AS NEEDED
Status: COMPLETED | OUTPATIENT
Start: 2024-12-03 | End: 2024-12-03

## 2024-12-03 RX ORDER — METHYLPREDNISOLONE ACETATE 80 MG/ML
INJECTION, SUSPENSION INTRA-ARTICULAR; INTRALESIONAL; INTRAMUSCULAR; SOFT TISSUE AS NEEDED
Status: COMPLETED | OUTPATIENT
Start: 2024-12-03 | End: 2024-12-03

## 2024-12-03 RX ADMIN — LIDOCAINE HYDROCHLORIDE 10 ML: 10 INJECTION, SOLUTION EPIDURAL; INFILTRATION; INTRACAUDAL; PERINEURAL at 12:05

## 2024-12-03 RX ADMIN — METHYLPREDNISOLONE ACETATE 80 MG: 80 INJECTION, SUSPENSION INTRA-ARTICULAR; INTRALESIONAL; INTRAMUSCULAR; SOFT TISSUE at 12:05

## 2024-12-03 RX ADMIN — BUPIVACAINE HYDROCHLORIDE 5 ML: 5 INJECTION, SOLUTION EPIDURAL; INTRACAUDAL; PERINEURAL at 12:05

## 2024-12-03 NOTE — DISCHARGE INSTR - AVS FIRST PAGE
YOUR 2 WEEK FOLLOW UP HAS BEEN SCHEDULED; IF YOU WISH TO CHANGE THE FOLLOW UP, PLEASE CALL THE [facility] AT [place]: [phone]    MEDIAL BRANCH BLOCK DISCHARGE INSTRUCTIONS      ACTIVITY  Please do activities that will bring the normal pain that we are rating. For example, if vacuuming or walking increases the pain, then do them. This will give the most accurate response to the diary.  You may shower, but no tub baths today, or applied heat.  Avoid driving/operating heavy machinery for 24 hours    CARE OF THE INJECTION SITE  This area may be numb for several hours after the injection.  Notify the [facility] if you have any of the following: redness, drainage, swelling or fever above 100°F.    SPECIAL INSTRUCTIONS  Please return the MBB diary to our office by mail, fax, or drop it off.    MEDICATIONS  Please do not take any break through or short acting pain medications for 8 hours after the block.  Continue to take all routine medications.  Our office may have instructed you to hold some medications. You may resume these medications now.      If you have any problems specifically related to your procedure, please call our office at [phone]. Problems not related to your procedure should be directed at your primary care physician.    Lumbar Radiofrequency Ablation   WHAT YOU NEED TO KNOW:   What do I need to know about lumbar radiofrequency ablation?  Lumbar radiofrequency ablation (RFA) is a procedure used to treat facet joint pain in your lower back. Facet joints are found at the back of each vertebra. A needle electrode is used to send electrical currents to the nerves in your facet joint. The electrical currents create heat that damages the nerve so it cannot send pain signals.   How do I prepare for lumbar RFA?  Your healthcare provider will talk to you about how to prepare for this procedure. You may be told to not to eat or drink anything after midnight on the day of your  procedure. Your provider will tell you what medicines to take or not take on the day of your procedure.  What will happen during lumbar RFA?   You will lie on your stomach. You will be given local anesthesia to numb the area of your back where the needle electrode will be inserted. You may be given a sedative to help keep you relaxed. You may still feel pressure or pushing during the procedure, but you should not feel any pain. Your healthcare provider will use fluoroscopy (a type of x-ray) to guide the needle electrode to the nerves near your facet joint.     Your healthcare provider may touch the affected nerve to make sure the needle electrode is in the right place. You will feel tingling or pressure when your provider does this. Your provider will then apply local anesthesia to the nerve to numb it. This will prevent you from feeling pain when your provider applies heat to the nerve. Your provider will then apply heat to the nerve using the needle electrode. Your provider may need to apply heat to more than one nerve. Your provider will remove the needle electrode and apply a bandage over the area.    What are the risks of lumbar RFA?  You may have pain, numbness, tingling, or burning in the area where the lumbar RFA was done. These normally go away within 6 weeks. The needle electrode may injure your spinal nerves. This may cause permanent leg weakness or nerve pain.  CARE AGREEMENT:   You have the right to help plan your care. Learn about your health condition and how it may be treated. Discuss treatment options with your healthcare providers to decide what care you want to receive. You always have the right to refuse treatment. The above information is an  only. It is not intended as medical advice for individual conditions or treatments. Talk to your doctor, nurse or pharmacist before following any medical regimen to see if it is safe and effective for you.  © Copyright [employer] 2023  Information is for End User's use only and may not be sold, redistributed or otherwise used for commercial purposes.

## 2024-12-03 NOTE — INTERVAL H&P NOTE
H&P reviewed. After examining the patient I find no changes in the patients condition since the H&P had been written.    Vitals:    12/03/24 1111   BP: 164/72   Pulse: 78   Resp: 15   Temp: 97.9 °F (36.6 °C)   SpO2: 97%

## 2024-12-05 ENCOUNTER — OFFICE VISIT (OUTPATIENT)
Dept: OTOLARYNGOLOGY | Facility: CLINIC | Age: 74
End: 2024-12-05

## 2024-12-05 VITALS
HEIGHT: 71 IN | SYSTOLIC BLOOD PRESSURE: 139 MMHG | OXYGEN SATURATION: 97 % | WEIGHT: 205 LBS | BODY MASS INDEX: 28.7 KG/M2 | DIASTOLIC BLOOD PRESSURE: 68 MMHG | HEART RATE: 77 BPM | RESPIRATION RATE: 16 BRPM | TEMPERATURE: 98.7 F

## 2024-12-05 DIAGNOSIS — H61.23 BILATERAL IMPACTED CERUMEN: ICD-10-CM

## 2024-12-05 DIAGNOSIS — H93.8X3 SENSATION OF PLUGGED EAR ON BOTH SIDES: Primary | ICD-10-CM

## 2024-12-05 RX ORDER — ERYTHROMYCIN 5 MG/G
OINTMENT OPHTHALMIC
COMMUNITY
Start: 2024-11-18

## 2024-12-05 NOTE — PROGRESS NOTES
[facility]'s Otolaryngology Follow up  visit      Geo Cates is a 74 y.o. male who presents with a chief complaint of ears    Independent historian: none    Time interval of problem since last visit:  6 months     Pertinent elements of the history:  Doing well  Ears are blocked    No otalgia  No otorrhea  No tinnitus    Nasal congestion is controlled.    Hx of R supraclavicular soft tissue mass x 2  Had them for years, no changes    Review of any relevant imaging: images from any scan reviewed personally  Scans:   Labs:   Notes:     Review of Systems:  As above    PMHx:  Past Medical History:   Diagnosis Date    Dental disease years    Depression     Diabetes mellitus (HCC)     Dizziness weeks    Ear problems years    HL (hearing loss) months    Hypertension     Nasal congestion months    Sleep difficulties months    Tinnitus months        FAMHx:  Family History   Problem Relation Age of Onset    Diabetes Mother     Hypertension Mother     Hypertension Father     Heart disease Father        SOCHx:  Social History     Socioeconomic History    Marital status: Single     Spouse name: Not on file    Number of children: Not on file    Years of education: Not on file    Highest education level: Not on file   Occupational History    Not on file   Tobacco Use    Smoking status: Every Day     Current packs/day: 1.00     Average packs/day: 1 pack/day for 35.0 years (35.0 ttl pk-yrs)     Types: Cigarettes    Smokeless tobacco: Never   Vaping Use    Vaping status: Never Used   Substance and Sexual Activity    Alcohol use: Yes     Alcohol/week: 2.0 standard drinks of alcohol     Types: 2 Cans of beer per week    Drug use: Not Currently    Sexual activity: Not Currently   Other Topics Concern    Not on file   Social History Narrative    Not on file     Social Drivers of Health     Financial Resource Strain: Low Risk  (7/6/2023)    Received from [facility]    Financial Resource Strain     Do you have any trouble paying for your  medications, or do you think you might in the future?: No     Does your family have trouble paying for medicine? (Household - for ages 0-17 years): Not on file   Food Insecurity: No Food Insecurity (7/6/2023)    Received from [facility]    Food Insecurity     Do you need food for this week?: No     Are you able to get enough food for your family? (Household - for ages 0-17 years): Not on file     Does your family need food this week? (Household - for ages 0-17 years): Not on file     Do you always have enough food for your family? (Household - for ages 0-17 years): Not on file   Transportation Needs: No Transportation Needs (7/6/2023)    Received from [facility]    Transportation Needs     READ ONLY Do you have trouble getting a ride to medical visits or work?: Never True     Does your family have a hard time getting a ride to doctors’ visits? (Household - for ages 0-17 years): Not on file     Has lack of transportation kept you from medical appointments, meetings, work, or from getting things needed for daily living? Check all that apply. (Adult - for ages 18 years and over): Not on file     Do you (or your family) have trouble finding or paying for a ride (transportation)? (Household - for ages 0-17 years): Not on file   Physical Activity: Not on file   Stress: Not on file   Social Connections: Unknown (7/7/2024)    Received from [facility]    Social Connections     How often do you feel lonely or isolated from those around you? (Adult - for ages 18 years and over): Not on file   Intimate Partner Violence: Not on file   Housing Stability: Low Risk  (7/6/2023)    Received from [facility]    Housing Stability     Do you currently live in a shelter or have no steady place to sleep at night?: No     READ ONLY Do you think you are at risk of becoming homeless?: No     Does your family worry about paying for your home or becoming homeless? (Household - for ages 0-17 years): Not on file     Are you homeless or worried that  "you might be in the future? (Adult - for ages 18 years and over): Not on file     Are you (or your family) homeless or worried that you might be in the future? (Household - for ages 0-17 years): Not on file       Allergies:  Allergies   Allergen Reactions    Niacin Other (See Comments)        MEDS:  Reviewed      Physical exam: (abnormal findings appear in bold and supercede any conflicting normal findings listed below)    /68 (Patient Position: Sitting, Cuff Size: Adult)   Pulse 77   Temp 98.7 °F (37.1 °C) (Temporal)   Resp 16   Ht 5' 10.5\" (1.791 m)   Wt 93 kg (205 lb)   SpO2 97%   BMI 29.00 kg/m²     Constitutional:  Well developed, well nourished and groomed, in no acute distress.     Eyes:  Extra-ocular movements intact, pupils equally round and reactive to light and accommodation, the lids and conjunctivae are normal in appearance.    Head: Atraumatic, normocephalic, no visible scalp lesions, bony palpation unremarkable without stepoffs, parotid and submandibular salivary glands non-tender to palpation and without masses bilaterally.     Ears:  Auricles normal in appearance bilaterally, mastoid prominence non-tender, external auditory canals clear bilaterally, tympanic membranes intact bilaterally without evidence of middle ear effusion or masses, normal appearing ossicles.  Bilateral cerumen impaction. See proc.    Nose/Sinuses:  External appearance unremarkable, no maxillary or frontal sinus tenderness to palpation bilaterally. Anterior rhinoscopy demonstrates pink mucosa. No polyps or other masses identified. Turbinates are non-edematous. No evidence of purulent drainage. DNS    Oral Cavity:  Moist mucus membranes, gums and dentition unremarkable, no oral mucosal masses or lesions, floor of mouth soft, tongue mobile without masses or lesions.     Oropharynx:  Base of tongue soft and without masses, tonsils bilaterally unremarkable, soft palate mucosa unremarkable.     Neck:  No visible or " palpable cervical lesions or lymphadenopathy, thyroid gland is normal in size and symmetry and without masses, normal laryngeal elevation with swallowing. Large, ~10 cm supraclavicular soft tissue mass cw lipoma. Smaller mass of similar characteristics, mobile. approx 3.5 cm. Stable in size and consistency.     Cardiovascular:  Normal rate and rhythm, no palpable thrills, no jugulovenous distension observed.  Respiratory:  Normal respiratory effort without evidence of retractions or use of accessory muscles.  Integument:  Normal appearing without observed masses or lesions.  Neurologic:  Cranial nerves II-XII intact bilaterally.  Psychiatric:  Alert and oriented to time, place and person, normal affect.      Procedure:  Procedure: Cerumen debridement right EAC     Indications: Cerumen impaction right EAC     Procedure in detail: After informed verbal consent was obtained the ear was visualized using procedural otoscope. Affected ear was debrided of cerumen using cerumen loop, suction, and alligator forceps. The findings below were seen. The patient tolerated the procedure well.     FINDINGS: Cerumen impaction removed without difficulty.      Audiometry:  Audiogram 06/2024 reviewed with patient and audiologist. Normal hearing in the low to mid frequencies. Mild sloping to moderately severe high frequency SNHL. Word recognition 100% at 65 dB bilaterally. Tympanograms type A bilaterally.        Assessment:  1. Sensation of plugged ear on both sides        2. Bilateral impacted cerumen                Plan:  1. Geo Cates is a 74 y.o. male with acute and chronic problems as above who presents for a re-evaluation of hearing. Bilateral cerumen impaction removed without difficulty. Patient tolerated well. Oil and cerumen within the EAC is a natural process. Recommend against using Q-tips. Patient can consider application of OTC Debrox as needed to facilitate removal but would overall recommend routine follow up to remove  "wax.    Follow up 6 months.              ** Please Note: Portions of the record may have been created with voice recognition software. Occasional wrong word or \"sound a like\" substitutions may have occurred due to the inherent limitations of voice recognition software. There may also be notations and random deletions of words or characters from malfunctioning software. Read the chart carefully and recognize, using context, where substitutions/deletions have occurred.**  "

## 2024-12-11 ENCOUNTER — PREP FOR PROCEDURE (OUTPATIENT)
Dept: PAIN MEDICINE | Facility: CLINIC | Age: 74
End: 2024-12-11

## 2024-12-11 ENCOUNTER — OFFICE VISIT (OUTPATIENT)
Dept: PAIN MEDICINE | Facility: CLINIC | Age: 74
End: 2024-12-11
Payer: COMMERCIAL

## 2024-12-11 VITALS
HEIGHT: 71 IN | OXYGEN SATURATION: 96 % | RESPIRATION RATE: 20 BRPM | WEIGHT: 200.2 LBS | TEMPERATURE: 98.1 F | DIASTOLIC BLOOD PRESSURE: 72 MMHG | HEART RATE: 95 BPM | BODY MASS INDEX: 28.03 KG/M2 | SYSTOLIC BLOOD PRESSURE: 140 MMHG

## 2024-12-11 DIAGNOSIS — M47.816 LUMBAR SPONDYLOSIS: Primary | ICD-10-CM

## 2024-12-11 PROCEDURE — 99213 OFFICE O/P EST LOW 20 MIN: CPT | Performed by: ANESTHESIOLOGY

## 2024-12-11 PROCEDURE — G2211 COMPLEX E/M VISIT ADD ON: HCPCS | Performed by: ANESTHESIOLOGY

## 2024-12-11 NOTE — PATIENT INSTRUCTIONS
Patient Education     Core Strengthening Exercises on Back or on Hands and Knees   About this topic   Your core muscles are in your chest, back, buttock, and stomach area. They are your abdominal, back, and pelvis muscles. These muscles help keep your body stable when using your arms or legs. They also help with balance and posture. There are many exercises you can do to keep these muscles strong.  If you have back problems like a compression fracture or a ruptured disc, doing some of these exercises could make your problem worse. Some of these exercises may cause lower back pain.  General   Before starting with a program, ask your doctor if you are healthy enough to do these exercises. Your doctor may have you work with a , chiropractor, or physical therapist to make a safe exercise program to meet your needs.  Strengthening Exercises   Strengthening exercises keep your muscles firm and strong. Start by repeating each exercise 2 to 3 times. Work up to doing each exercise 10 times. Try to do the exercises 2 to 3 times each day. Hold each exercise for 3 to 5 seconds. Do all exercises slowly.  Hip lifts ? Lie on your back with your knees bent and feet flat on the floor. Tighten your stomach muscles and push your heels into the floor to lift your buttocks off the floor. Relax.  Pelvic tilts ? Lie on your back with your knees bent and feet flat on the floor. Tighten your stomach muscles and press your lower back down to the floor. Relax.  Straight leg raises lying down ? Lie on your back with one leg straight. Bend your other knee so the foot is flat on the bed. Keeping your leg straight, lift the leg up to the level of your other knee. Lower it back down. Repeat with the other leg.  Knee flex lying down ? Lie on your back with both knees bent and your feet flat on the floor. Tighten your belly muscles. Raise one leg up and back down as if you are marching in slow motion. Keep belly muscles tight while you move  your leg. Switch legs. To make this exercise harder, raise both arms straight up in the air. Tighten your belly muscles. When you raise one leg up, reach the opposite arm over your head. Switch, moving the opposite arm and leg until you have done 10 repetitions on each side.  Abdominal crunches ? Lie on your back with both knees bent. Keep your feet flat on the floor. Place your hands in one of these positions. Try starting with the first position since it is the easiest. As you get better, use the other positions to make it harder.  Crunches with arms at sides.  Crunches with arms across chest.  Crunches with arms behind head. Be careful not to interlock your fingers behind your neck or head while doing crunches. This may add tension to your neck and cause strain.  Look at the ceiling. Tighten your belly muscles and lift your shoulders and upper back off the floor. Breathe out while you are doing this. Lower your shoulders to the floor. Breathe in while you are doing this. Relax your belly muscles all the way before starting another crunch.  Arm and leg lifts on hands and knees ? Start on your hands and knees. With all of these exercises, keep your back as level as possible. If you are having trouble with this, you may want to put a small object on your back such as a book. If it falls off, you are not keeping your back level enough during the exercise.  Lift one arm up to shoulder level and hold. Lower it back down. Now, lift up the other arm and hold.  Lift one leg up and kick it straight out until it is in line with your back and hold. Lower it back down. Now, lift up the other leg and hold.  Lift one arm and the OPPOSITE leg up at the same time and hold. Lower them down. Now, repeat using the other arm and leg. This is a very hard exercise. It may take time to be able to do this.               What will the results be?   Stronger core  Better balance  More toned belly and back muscles  Easier to do daily  activities  Better sports performance  Less low back pain  Helpful tips   Stay active and work out to keep your muscles strong and flexible.  Keep a healthy weight to avoid putting too much stress on your spine. Eat a healthy diet to keep your muscles healthy.  Be sure you do not hold your breath when exercising. This can raise your blood pressure. If you tend to hold your breath, try counting out loud when exercising. If any exercise bothers you, stop right away.  Try walking or cycling at an easy pace for a few minutes to warm up your muscles. Do this again after exercising.  Exercise may be slightly uncomfortable, but you should not have sharp pains. If you do get sharp pains, stop what you are doing. If the sharp pains continue, call your doctor.  Last Reviewed Date   2021-03-18  Consumer Information Use and Disclaimer   This generalized information is a limited summary of diagnosis, treatment, and/or medication information. It is not meant to be comprehensive and should be used as a tool to help the user understand and/or assess potential diagnostic and treatment options. It does NOT include all information about conditions, treatments, medications, side effects, or risks that may apply to a specific patient. It is not intended to be medical advice or a substitute for the medical advice, diagnosis, or treatment of a health care provider based on the health care provider's examination and assessment of a patient’s specific and unique circumstances. Patients must speak with a health care provider for complete information about their health, medical questions, and treatment options, including any risks or benefits regarding use of medications. This information does not endorse any treatments or medications as safe, effective, or approved for treating a specific patient. [employer] and its affiliates disclaim any warranty or liability relating to this information or the use thereof. The use of this information  is governed by the Terms of Use, available at https://www.[employer].com/en/know/clinical-effectiveness-terms   Copyright   Copyright © 2024 [employer] and its affiliates and/or licensors. All rights reserved.

## 2024-12-11 NOTE — PROGRESS NOTES
Assessment  1. Lumbar spondylosis    Greater than 90% relief of pain with improved ability to participate with IADLs after bilateral L3, L4, L5 medial branch blocks #1 thus far; has been compliant with formal PT. Gabapentin significantly improving pain, participation with IADLs without side effects. Previously reported the following symptomatology:     Axial low back pain described primarily by arthritic features.  Aching, nagging, indolent, stabbing, throbbing features in axial low back without radicular components.  5/5 strength bilaterally in lower extremities, negative SLR.  Positive facet loading maneuvers in lumbar spine elicited pain, positive tenderness to palpation over lumbar paraspinal muscles.  Findings correlate with prominent lumbar facet arthropathy seen throughout axial low back on MRI imaging studies.  Currently he is neurologically intact without gait instability, saddle anesthesia or bowel/bladder abnormality. Risks, benefits and alternatives to medial branch blocks and subsequent radiofrequency ablation if successful thoroughly discussed with patient.  Handouts provided questions answered to patient satisfaction. Lifestyle modifications extensively discussed including diet, exercise and weight loss in addition to core strengthening and smoking cessation.  Will proceed with multimodal pain therapy plan as noted below:    The patient has been experiencing moderate to severe axial spine pain that is causing functional deficit.  The pain has been present for at least 3 months and is not improving with conservative care.  Currently the patient is not experiencing any radicular features nor neurogenic claudication.  Non-facet pathology has been ruled out on clinical evaluation.        Plan  -bilateral L3, L4, L5 medial branch blocks #2; f/u 2 weeks post procedure  -gabapentin 600mg TID for patient; counseled regarding sedative effects of taking this medication and provided up titration calendar.   Counseled not to take medication while driving or operating heavy machinery/using stairs  -has been participant with formal physical therapy for lumbar spondylosis; Physician directed home exercise plan as per [employer] demonstrated and handouts provided that patient plans to participate with for 1 hour, twice a week for the next 6 weeks.     There are risks associated with opioid medications, including dependence, addiction and tolerance. The patient understands and agrees to use these medications only as prescribed. Potential side effects of the medications include, but are not limited to, constipation, drowsiness, addiction, impaired judgment and risk of fatal overdose if not taken as prescribed. The patient was warned against driving while taking sedation medications or operating heavy machinery. The patient voiced understanding. Sharing medications is a felony. At this point in time, the patient is showing no signs of addiction, abuse, diversion or suicidal ideation.     [place] Prescription Drug Monitoring Program report was reviewed and was appropriate      Complete risks and benefits including bleeding, infection, tissue reaction, nerve injury and allergic reaction were discussed. The approach was demonstrated using models and literature was provided. Verbal and written consent was obtained.     My impressions and treatment recommendations were discussed in detail with the patient who verbalized understanding and had no further questions.  Discharge instructions were provided. I personally saw and examined the patient and I agree with the above discussed plan of care.    Review of external notes including PT notes, PCP notes and specialist notes was performed at this visit in addition to review of new ordered imaging and past imaging to develop or modify multidisciplinary pain plan    No orders of the defined types were placed in this encounter.      History of Present Illness    Geo Cates is a 74 y.o.  male with pmhx of DM-2 (NIDDM) HgbA1c 5.9%, HTN, COPD (smoker), CKD-2 presenting with chronic    low back pain described primarily as arthritic in nature.  He describes 8-9/10 low back pain that is worse in the mornings and worse at the end of the day.  The pain is characterized by achy, nagging, indolent, crampy, stabbing pain in his axial low back.  The patient describes that the pain is worse with standing for long periods of time on hard surfaces as well as with walking.  The patient is a very active individual and feels as though this pain compromises his participation with independent activities of daily living. The pain can be debilitating at times and contribute to significant disability, compromising overall activity and independent activities of daily living.  He has tried physical therapy with limited relief of symptoms.  Medications the patient has tried in the past include nsaids, tylenol and gabapentin with modest relief.  He describes no radicular symptoms and has good strength.  He denies any weakness numbness or paresthesias. The patient denies any bowel or bladder dysfunction, saddle anesthesia or gait instability as well.       I have personally reviewed and/or updated the patient's past medical history, past surgical history, family history, social history, current medications, allergies, and vital signs today.     Review of Systems   Constitutional:  Positive for activity change.   HENT: Negative.     Eyes: Negative.    Respiratory: Negative.     Cardiovascular: Negative.    Gastrointestinal: Negative.    Endocrine: Negative.    Genitourinary: Negative.    Musculoskeletal:  Positive for arthralgias, back pain, gait problem and myalgias.   Skin: Negative.    Allergic/Immunologic: Negative.    Neurological:  Positive for weakness and numbness.   Hematological: Negative.    Psychiatric/Behavioral: Negative.     All other systems reviewed and are negative.      Patient Active Problem List    Diagnosis    Recurrent major depressive disorder, in partial remission (HCC)    Peripheral vascular disease (HCC)    Type 2 diabetes mellitus with hemoglobin A1c goal of less than 7.0% (HCC)    Left inguinal hernia    Tobacco use    Lumbar spondylosis       Past Medical History:   Diagnosis Date    Dental disease years    Depression     Diabetes mellitus (HCC)     Dizziness weeks    Ear problems years    HL (hearing loss) months    Hypertension     Nasal congestion months    Sleep difficulties months    Tinnitus months       Past Surgical History:   Procedure Laterality Date    IR SPINE AND PAIN PROCEDURE  9/17/2024    IR SPINE AND PAIN PROCEDURE  10/22/2024    IR SPINE AND PAIN PROCEDURE  12/3/2024       Family History   Problem Relation Age of Onset    Diabetes Mother     Hypertension Mother     Hypertension Father     Heart disease Father        Social History     Occupational History    Not on file   Tobacco Use    Smoking status: Every Day     Current packs/day: 1.00     Average packs/day: 1 pack/day for 35.0 years (35.0 ttl pk-yrs)     Types: Cigarettes    Smokeless tobacco: Never   Vaping Use    Vaping status: Never Used   Substance and Sexual Activity    Alcohol use: Yes     Alcohol/week: 2.0 standard drinks of alcohol     Types: 2 Cans of beer per week    Drug use: Not Currently    Sexual activity: Not Currently       Current Outpatient Medications on File Prior to Visit   Medication Sig    amLODIPine (NORVASC) 10 mg tablet 1 tablet every morning    Aspirin Buf,CaCarb-MgCarb-MgO, 81 MG TABS Take 1 tablet by mouth daily    atorvastatin (LIPITOR) 80 mg tablet Take 80 mg by mouth daily at bedtime    fenofibrate (TRICOR) 145 mg tablet 1 tablet every morning    gabapentin (Neurontin) 600 MG tablet Take 1 tablet (600 mg total) by mouth 3 (three) times a day    hydrOXYzine HCL (ATARAX) 25 mg tablet Take 0.5 tablets by mouth 2 (two) times a day as needed    losartan (COZAAR) 50 mg tablet Take 1 and 1/2 (one and  "one-half) tablet daily.    Melatonin 5 MG TBDP Take 1 tablet by mouth daily at bedtime As needed    metFORMIN (GLUCOPHAGE-XR) 500 mg 24 hr tablet 1 tablet every morning    tamsulosin (FLOMAX) 0.4 mg 1 capsule every morning    erythromycin (ILOTYCIN) ophthalmic ointment APPLY 1 CM IN LEFT EYE 3 TIMES A DAY (Patient not taking: Reported on 12/11/2024)     No current facility-administered medications on file prior to visit.       Allergies   Allergen Reactions    Niacin Other (See Comments)         Physical Exam    /72 (BP Location: Left arm, Patient Position: Sitting, Cuff Size: Adult)   Pulse 95   Temp 98.1 °F (36.7 °C) (Temporal)   Resp 20   Ht 5' 10.5\" (1.791 m)   Wt 90.8 kg (200 lb 3.2 oz)   SpO2 96%   BMI 28.32 kg/m²     Constitutional: normal, well developed, well nourished, alert, in no distress and non-toxic and no overt pain behavior. and overweight  Eyes: anicteric  HEENT: grossly intact  Neck: supple, symmetric, trachea midline and no masses   Pulmonary:even and unlabored  Cardiovascular:No edema or pitting edema present  Skin:Normal without rashes or lesions and well hydrated  Psychiatric:Mood and affect appropriate  Neurologic:Cranial Nerves II-XII grossly intact Sensation grossly intact; no clonus negative malhotra's. Reflexes 2+ and brisk.   Musculoskeletal:normal gait. 5/5 strength bilaterally with AROM in lower extremities. Difficulty with normal heel toe and tip toe walking. Signifiicant pain with lumbar facet loading bilaterally and with lateral spine rotation, ttp over lumbar paraspinal muscles. Negative jaja's test, negative gaenslen's negative SIJ loading bilaterally.    Imaging    MRI LUMBAR SPINE WITHOUT CONTRAST     INDICATION: M54.16: Radiculopathy, lumbar region.     COMPARISON: X-rays dated 7/11/2024     TECHNIQUE:  Multiplanar, multisequence imaging of the lumbar spine was performed. .        IMAGE QUALITY:  Diagnostic     FINDINGS:     VERTEBRAL BODIES:  There are 5 lumbar " type vertebral bodies.  Normal alignment of the lumbar spine.  No spondylolysis or spondylolisthesis. No scoliosis.  No compression fracture.    Normal marrow signal is identified within the visualized bony   structures.  No discrete marrow lesion.     SACRUM:  Normal signal within the sacrum. No evidence of insufficiency or stress fracture.     DISTAL CORD AND CONUS:  Normal size and signal within the distal cord and conus.     PARASPINAL SOFT TISSUES:  Paraspinal soft tissues are unremarkable.     LOWER THORACIC DISC SPACES:  Normal disc height and signal.  No disc herniation, canal stenosis or foraminal narrowing.     LUMBAR DISC SPACES:     L1-L2:  Normal.     L2-L3: Minimal annular bulging asymmetric towards the left without discrete disc herniation, canal stenosis or foraminal nerve impingement despite mild left-sided foraminal narrowing.     L3-L4: Mild circumferential annular bulging. Disc herniation. No canal stenosis. Mild foraminal narrowing without foraminal     L4-L5: Normal disc height. Moderate circumferential annular bulge. No focal disc herniation. Mild bilateral facet arthropathy and ligamentum flavum thickening. There is a well-circumscribed cystic structure within the right posterior lateral epidural   space medial to the facet joint measuring 1 cm 4.5 mm consistent with synovial cyst. These changes result in moderate tricompartmental canal stenosis in particular distorting the right posterior lateral aspect of the thecal sac. There is moderate left   and mild right foraminal narrowing identified.     L5-S1: Mild circumferential annular bulging and facet arthropathy. No disc herniation. No canal stenosis. Moderate bilateral foraminal narrowing with mild mass effect upon both nerves.     OTHER FINDINGS:  None.     IMPRESSION:     Number degenerative disc disease worst at the L4-5 level where there is diffuse annular bulging in addition to a 1 cm synovial cyst arising from the right facet joint  resulting in moderate tricompartmental canal stenosis and moderate left foraminal   narrowing.     Additional mild canal stenosis and foraminal narrowing at L2-3 and L3-4 and moderate bilateral foraminal narrowing at L1 5 S1.

## 2024-12-27 ENCOUNTER — TELEPHONE (OUTPATIENT)
Age: 74
End: 2024-12-27

## 2024-12-27 NOTE — TELEPHONE ENCOUNTER
Caller: patient    Doctor: ALANNAH    Reason for call: would like to cancel 1/2 procedure, would not like to reschedule for now.   Please confirm with patient once appt in canceled    Call back#:

## 2025-02-26 ENCOUNTER — PREP FOR PROCEDURE (OUTPATIENT)
Dept: PAIN MEDICINE | Facility: CLINIC | Age: 75
End: 2025-02-26

## 2025-02-26 DIAGNOSIS — M47.816 LUMBAR SPONDYLOSIS: Primary | ICD-10-CM

## 2025-02-28 ENCOUNTER — TELEPHONE (OUTPATIENT)
Dept: PAIN MEDICINE | Facility: CLINIC | Age: 75
End: 2025-02-28

## 2025-02-28 ENCOUNTER — TELEPHONE (OUTPATIENT)
Age: 75
End: 2025-02-28

## 2025-02-28 NOTE — TELEPHONE ENCOUNTER
Caller: Patient     Doctor:  N/a    Reason for call: Patient called received missed call, was receiving incoming call from [facility] during phone call disconnected call for patient to answer.    Call back#: [phone]

## 2025-02-28 NOTE — TELEPHONE ENCOUNTER
Tried calling patient back but voicemail is not set up. Unfortunately there are no sooner dates available for procedure before his 3/11 date. I will keep an eye out for if anyone cancels and can call him if that happens.

## 2025-03-06 ENCOUNTER — HOSPITAL ENCOUNTER (OUTPATIENT)
Dept: GASTROENTEROLOGY | Facility: HOSPITAL | Age: 75
Setting detail: OUTPATIENT SURGERY
End: 2025-03-06
Attending: ANESTHESIOLOGY
Payer: COMMERCIAL

## 2025-03-06 ENCOUNTER — PREP FOR PROCEDURE (OUTPATIENT)
Dept: PAIN MEDICINE | Facility: CLINIC | Age: 75
End: 2025-03-06

## 2025-03-06 VITALS
RESPIRATION RATE: 20 BRPM | DIASTOLIC BLOOD PRESSURE: 85 MMHG | HEART RATE: 102 BPM | OXYGEN SATURATION: 96 % | SYSTOLIC BLOOD PRESSURE: 164 MMHG | TEMPERATURE: 97.8 F

## 2025-03-06 DIAGNOSIS — M47.816 LUMBAR SPONDYLOSIS: ICD-10-CM

## 2025-03-06 DIAGNOSIS — M54.16 LUMBAR RADICULOPATHY: Primary | ICD-10-CM

## 2025-03-06 LAB — GLUCOSE SERPL-MCNC: 106 MG/DL (ref 65–140)

## 2025-03-06 PROCEDURE — 82948 REAGENT STRIP/BLOOD GLUCOSE: CPT

## 2025-03-06 RX ORDER — EZETIMIBE 10 MG/1
10 TABLET ORAL DAILY
COMMUNITY

## 2025-03-06 NOTE — DISCHARGE INSTR - AVS FIRST PAGE
YOUR 2 WEEK FOLLOW UP HAS BEEN SCHEDULED; IF YOU WISH TO CHANGE THE FOLLOW UP, PLEASE CALL THE [facility] AT [place]: [phone]    EPIDURAL STEROID INJECTION DISCHARGE INSTRUCTIONS  WHAT YOU NEED TO KNOW:   An epidural steroid injection (ESTRELLITA) is a procedure to inject steroid medicine into the epidural space. The epidural space is between your spinal cord and vertebrae. Steroids reduce inflammation and fluid buildup in your spine that may be causing pain. You may be given pain medicine along with the steroids.        DISCHARGE INSTRUCTIONS:   Call your local emergency number (911 in the [place]) if:   You have a seizure.    You have trouble moving your legs.    Seek care immediately if:   Blood soaks through your bandage.    You have a fever or chills, severe back pain, and the procedure area is sensitive to the touch.    You cannot control when you urinate or have a bowel movement.    Call your doctor if:   You have weakness or numbness in your legs.    Your wound is red, swollen, or draining pus.    You have nausea or are vomiting.    Your face or neck is red and you feel warm.    You have more pain than you had before the procedure.    You have swelling in your hands or feet.    You have questions or concerns about your condition or care.    Care for your wound as directed:  You may remove the bandage before you go to bed the day of your procedure. You may take a shower, but do not take a bath for at least 24 hours.   Self-care:   Do not drive,  use machines, or do strenuous activity for 24 hours after your procedure or as directed.     Continue other treatments  as directed. Steroid injections alone will not control your pain. The injections are meant to be used with other treatments, such as physical therapy.    Follow up with your doctor as directed:  Write down your questions so you remember to ask them during your visits.           ACTIVITY  Do not drive or operate machinery today.  No strenuous  activity today - bending, lifting, etc.   You may resume normal activities starting tomorrow - start slowly and as tolerated.  You may shower today, but not tub baths or hot tubs.  You may have numbness for several hours from the local anesthetics. Please use caution and common sense, especially with weight-bearing activities.    CARE OF THE INJECTION SITE  If you have soreness or pain apply ice to the area today (20 minutes on and 20 minutes off).  Starting tomorrow, you may resume normal activities  Notify the [facility] if you have any of the following: redness, drainage, swelling or fever above 100°F.      MEDICATIONS  Continue to take all routine medications.  Our office may have instructed you to hold some medications. You may restart medications, including blood thinners.

## 2025-03-06 NOTE — H&P
Assessment  1. Lumbar spondylosis  -     IR spine and pain procedure; Standing  -     IR spine and pain procedure    Greater than 90% relief of pain with improved ability to participate with IADLs after L5-S1 ILESI albeit for only 5 days before return of pain. MRI extensively reviewed with patient showing degenerative disc disease worst at the L4-5 level where there is diffuse annular bulging in addition to a 1 cm synovial cyst arising from the right facet joint resulting in moderate tricompartmental canal stenosis and moderate left foraminal narrowing. Continues to report the following symptomatology:     Right-sided lumbar radicular pain in the L4 and L5 dermatomal distribution accompanied by pain limited weakness numbness and paresthesias.  Patient has not yet participated with PT. Chronic pain with decreased participation with IADLs over the past 3 months.  Has been taking OTC ibuprofen and tylenol with modest benefit.  5/5 strength bilaterally, positive SLR right-sided. Reflexes 2+.  Additionally there is positive facet loading, R>L. Denies any gait instability, saddle anesthesia. On xray imaging Vertebral body heights are well-preserved. Disc space narrowing and osteophyte formation throughout the  lumbar spine. Mild facet hypertrophy.  Risks, benefits alternatives epidural steroid injections thoroughly discussed with patient.  Handouts provided questions answered to patient's satisfaction.  Lifestyle modifications extensively discussed including diet, exercise and weight loss in addition to core strengthening and smoking cessation.  Will proceed with multimodal pain therapy plan as noted below:    Plan  -Right L4 and L5 TFESI; f/u 2 weeks post procedure  -gabapentin increased to 600mg TID for patient; counseled regarding sedative effects of taking this medication and provided up titration calendar.  Counseled not to take medication while driving or operating heavy machinery/using stairs  -script provided  for formal physical therapy for right-sided lumbar radiculopathy; Physician directed home exercise plan as per [facility] demonstrated and handouts provided that patient plans to participate with for 1 hour, twice a week for the next 6 weeks.     There are risks associated with opioid medications, including dependence, addiction and tolerance. The patient understands and agrees to use these medications only as prescribed. Potential side effects of the medications include, but are not limited to, constipation, drowsiness, addiction, impaired judgment and risk of fatal overdose if not taken as prescribed. The patient was warned against driving while taking sedation medications or operating heavy machinery. The patient voiced understanding. Sharing medications is a felony. At this point in time, the patient is showing no signs of addiction, abuse, diversion or suicidal ideation.     [place] Prescription Drug Monitoring Program report was reviewed and was appropriate      Complete risks and benefits including bleeding, infection, tissue reaction, nerve injury and allergic reaction were discussed. The approach was demonstrated using models and literature was provided. Verbal and written consent was obtained.     My impressions and treatment recommendations were discussed in detail with the patient who verbalized understanding and had no further questions.  Discharge instructions were provided. I personally saw and examined the patient and I agree with the above discussed plan of care.    Review of external notes including PT notes, PCP notes and specialist notes was performed at this visit in addition to review of new ordered imaging and past imaging to develop or modify multidisciplinary pain plan    New Medications Ordered This Visit   Medications    ezetimibe (ZETIA) 10 mg tablet     Sig: Take 10 mg by mouth daily       History of Present Illness    Geo Cates is a 74 y.o. male with pmhx of DM-2 (NIDDM) HgbA1c 5.9%,  HTN, COPD (smoker), CKD-2 presenting with chronic lumbar radicular pain in the right L4 and L5 dermatomal distributions. Debilitating pain limited weakness numbness and paresthesias accompany the pain. The patient rates the pain at a 8-9/10 accompanied by electric shock-like shooting features and crampy burning pain in the aforementioned dermatomal distributions.  The pain is worse in the mornings as well as the end of the day; exertion such as walking for long periods of time seems to exacerbate the pain.  The patient can hardly walk more than a few blocks without having debilitating pain.  He tries to maintain an active lifestyle and finds that the current degree of pain seems to compromises his efforts.  The pain significantly impacts independent activities of daily living and contributes to significant disability.  He has not yet attempted physical therapy formally.  He has taken nsaids, tylenol with limited relief of the pain as well.  He has never tried epidural steroid injections in the past. He denies any bowel or bladder dysfunction/incontinence, saddle anesthesia or gait instability.     I have personally reviewed and/or updated the patient's past medical history, past surgical history, family history, social history, current medications, allergies, and vital signs today.     Review of Systems   Constitutional:  Positive for activity change.   HENT: Negative.     Eyes: Negative.    Respiratory: Negative.     Cardiovascular: Negative.    Gastrointestinal: Negative.    Endocrine: Negative.    Genitourinary: Negative.    Musculoskeletal:  Positive for arthralgias, back pain, gait problem and myalgias.   Skin: Negative.    Allergic/Immunologic: Negative.    Neurological:  Positive for weakness and numbness.   Hematological: Negative.    Psychiatric/Behavioral: Negative.     All other systems reviewed and are negative.      Patient Active Problem List   Diagnosis    Recurrent major depressive disorder, in  partial remission (HCC)    Peripheral vascular disease (HCC)    Type 2 diabetes mellitus with hemoglobin A1c goal of less than 7.0% (HCC)    Left inguinal hernia    Tobacco use    Lumbar spondylosis       Past Medical History:   Diagnosis Date    Dental disease years    Depression     Diabetes mellitus (HCC)     Dizziness weeks    Ear problems years    HL (hearing loss) months    Hypertension     Nasal congestion months    Sleep difficulties months    Tinnitus months       Past Surgical History:   Procedure Laterality Date    IR SPINE AND PAIN PROCEDURE  9/17/2024    IR SPINE AND PAIN PROCEDURE  10/22/2024    IR SPINE AND PAIN PROCEDURE  12/3/2024       Family History   Problem Relation Age of Onset    Diabetes Mother     Hypertension Mother     Hypertension Father     Heart disease Father        Social History     Occupational History    Not on file   Tobacco Use    Smoking status: Every Day     Current packs/day: 1.00     Average packs/day: 1 pack/day for 35.0 years (35.0 ttl pk-yrs)     Types: Cigarettes    Smokeless tobacco: Never   Vaping Use    Vaping status: Never Used   Substance and Sexual Activity    Alcohol use: Yes     Alcohol/week: 2.0 standard drinks of alcohol     Types: 2 Cans of beer per week    Drug use: Not Currently    Sexual activity: Not Currently       Current Outpatient Medications on File Prior to Encounter   Medication Sig    amLODIPine (NORVASC) 10 mg tablet 1 tablet every morning    Aspirin Buf,CaCarb-MgCarb-MgO, 81 MG TABS Take 1 tablet by mouth daily    atorvastatin (LIPITOR) 80 mg tablet Take 80 mg by mouth daily at bedtime    ezetimibe (ZETIA) 10 mg tablet Take 10 mg by mouth daily    fenofibrate (TRICOR) 145 mg tablet 1 tablet every morning    hydrOXYzine HCL (ATARAX) 25 mg tablet Take 0.5 tablets by mouth 2 (two) times a day as needed    losartan (COZAAR) 50 mg tablet Take 100 mg by mouth daily    Melatonin 5 MG TBDP Take 1 tablet by mouth daily at bedtime As needed    metFORMIN  (GLUCOPHAGE-XR) 500 mg 24 hr tablet 1 tablet every morning    tamsulosin (FLOMAX) 0.4 mg 1 capsule every morning    [DISCONTINUED] erythromycin (ILOTYCIN) ophthalmic ointment APPLY 1 CM IN LEFT EYE 3 TIMES A DAY (Patient not taking: Reported on 12/11/2024)    [DISCONTINUED] gabapentin (Neurontin) 600 MG tablet Take 1 tablet (600 mg total) by mouth 3 (three) times a day     No current facility-administered medications on file prior to encounter.       Allergies   Allergen Reactions    Niacin Other (See Comments)         Physical Exam    /85   Pulse 102   Temp 97.8 °F (36.6 °C) (Temporal)   Resp 20   SpO2 96%     Constitutional: normal, well developed, well nourished, alert, in no distress and non-toxic and no overt pain behavior. and overweight  Eyes: anicteric  HEENT: grossly intact  Neck: supple, symmetric, trachea midline and no masses   Pulmonary:even and unlabored  Cardiovascular:No edema or pitting edema present  Skin:Normal without rashes or lesions and well hydrated  Psychiatric:Mood and affect appropriate  Neurologic:Cranial Nerves II-XII grossly intact Sensation grossly intact; no clonus negative malhtora's. Reflexes 2+ and brisk. SLR positive right sided  Musculoskeletal:normal gait. 5/5 strength bilaterally with AROM in lower extremities. Difficulty with normal heel toe and tip toe walking. Signifiicant pain with lumbar facet loading bilaterally and with lateral spine rotation, ttp over lumbar paraspinal muscles. Negative jaja's test, negative gaenslen's negative SIJ loading bilaterally.    Imaging    MRI LUMBAR SPINE WITHOUT CONTRAST     INDICATION: M54.16: Radiculopathy, lumbar region.     COMPARISON: X-rays dated 7/11/2024     TECHNIQUE:  Multiplanar, multisequence imaging of the lumbar spine was performed. .        IMAGE QUALITY:  Diagnostic     FINDINGS:     VERTEBRAL BODIES:  There are 5 lumbar type vertebral bodies.  Normal alignment of the lumbar spine.  No spondylolysis or  spondylolisthesis. No scoliosis.  No compression fracture.    Normal marrow signal is identified within the visualized bony   structures.  No discrete marrow lesion.     SACRUM:  Normal signal within the sacrum. No evidence of insufficiency or stress fracture.     DISTAL CORD AND CONUS:  Normal size and signal within the distal cord and conus.     PARASPINAL SOFT TISSUES:  Paraspinal soft tissues are unremarkable.     LOWER THORACIC DISC SPACES:  Normal disc height and signal.  No disc herniation, canal stenosis or foraminal narrowing.     LUMBAR DISC SPACES:     L1-L2:  Normal.     L2-L3: Minimal annular bulging asymmetric towards the left without discrete disc herniation, canal stenosis or foraminal nerve impingement despite mild left-sided foraminal narrowing.     L3-L4: Mild circumferential annular bulging. Disc herniation. No canal stenosis. Mild foraminal narrowing without foraminal     L4-L5: Normal disc height. Moderate circumferential annular bulge. No focal disc herniation. Mild bilateral facet arthropathy and ligamentum flavum thickening. There is a well-circumscribed cystic structure within the right posterior lateral epidural   space medial to the facet joint measuring 1 cm 4.5 mm consistent with synovial cyst. These changes result in moderate tricompartmental canal stenosis in particular distorting the right posterior lateral aspect of the thecal sac. There is moderate left   and mild right foraminal narrowing identified.     L5-S1: Mild circumferential annular bulging and facet arthropathy. No disc herniation. No canal stenosis. Moderate bilateral foraminal narrowing with mild mass effect upon both nerves.     OTHER FINDINGS:  None.     IMPRESSION:     Number degenerative disc disease worst at the L4-5 level where there is diffuse annular bulging in addition to a 1 cm synovial cyst arising from the right facet joint resulting in moderate tricompartmental canal stenosis and moderate left foraminal    narrowing.     Additional mild canal stenosis and foraminal narrowing at L2-3 and L3-4 and moderate bilateral foraminal narrowing at L1 5 S1.

## 2025-03-06 NOTE — H&P (VIEW-ONLY)
Assessment  1. Lumbar spondylosis  -     IR spine and pain procedure; Standing  -     IR spine and pain procedure    Greater than 90% relief of pain with improved ability to participate with IADLs after L5-S1 ILESI albeit for only 5 days before return of pain. MRI extensively reviewed with patient showing degenerative disc disease worst at the L4-5 level where there is diffuse annular bulging in addition to a 1 cm synovial cyst arising from the right facet joint resulting in moderate tricompartmental canal stenosis and moderate left foraminal narrowing. Continues to report the following symptomatology:     Right-sided lumbar radicular pain in the L4 and L5 dermatomal distribution accompanied by pain limited weakness numbness and paresthesias.  Patient has not yet participated with PT. Chronic pain with decreased participation with IADLs over the past 3 months.  Has been taking OTC ibuprofen and tylenol with modest benefit.  5/5 strength bilaterally, positive SLR right-sided. Reflexes 2+.  Additionally there is positive facet loading, R>L. Denies any gait instability, saddle anesthesia. On xray imaging Vertebral body heights are well-preserved. Disc space narrowing and osteophyte formation throughout the  lumbar spine. Mild facet hypertrophy.  Risks, benefits alternatives epidural steroid injections thoroughly discussed with patient.  Handouts provided questions answered to patient's satisfaction.  Lifestyle modifications extensively discussed including diet, exercise and weight loss in addition to core strengthening and smoking cessation.  Will proceed with multimodal pain therapy plan as noted below:    Plan  -Right L4 and L5 TFESI; f/u 2 weeks post procedure  -gabapentin increased to 600mg TID for patient; counseled regarding sedative effects of taking this medication and provided up titration calendar.  Counseled not to take medication while driving or operating heavy machinery/using stairs  -script provided  for formal physical therapy for right-sided lumbar radiculopathy; Physician directed home exercise plan as per [facility] demonstrated and handouts provided that patient plans to participate with for 1 hour, twice a week for the next 6 weeks.     There are risks associated with opioid medications, including dependence, addiction and tolerance. The patient understands and agrees to use these medications only as prescribed. Potential side effects of the medications include, but are not limited to, constipation, drowsiness, addiction, impaired judgment and risk of fatal overdose if not taken as prescribed. The patient was warned against driving while taking sedation medications or operating heavy machinery. The patient voiced understanding. Sharing medications is a felony. At this point in time, the patient is showing no signs of addiction, abuse, diversion or suicidal ideation.     [place] Prescription Drug Monitoring Program report was reviewed and was appropriate      Complete risks and benefits including bleeding, infection, tissue reaction, nerve injury and allergic reaction were discussed. The approach was demonstrated using models and literature was provided. Verbal and written consent was obtained.     My impressions and treatment recommendations were discussed in detail with the patient who verbalized understanding and had no further questions.  Discharge instructions were provided. I personally saw and examined the patient and I agree with the above discussed plan of care.    Review of external notes including PT notes, PCP notes and specialist notes was performed at this visit in addition to review of new ordered imaging and past imaging to develop or modify multidisciplinary pain plan    New Medications Ordered This Visit   Medications    ezetimibe (ZETIA) 10 mg tablet     Sig: Take 10 mg by mouth daily       History of Present Illness    Geo Cates is a 74 y.o. male with pmhx of DM-2 (NIDDM) HgbA1c 5.9%,  HTN, COPD (smoker), CKD-2 presenting with chronic lumbar radicular pain in the right L4 and L5 dermatomal distributions. Debilitating pain limited weakness numbness and paresthesias accompany the pain. The patient rates the pain at a 8-9/10 accompanied by electric shock-like shooting features and crampy burning pain in the aforementioned dermatomal distributions.  The pain is worse in the mornings as well as the end of the day; exertion such as walking for long periods of time seems to exacerbate the pain.  The patient can hardly walk more than a few blocks without having debilitating pain.  He tries to maintain an active lifestyle and finds that the current degree of pain seems to compromises his efforts.  The pain significantly impacts independent activities of daily living and contributes to significant disability.  He has not yet attempted physical therapy formally.  He has taken nsaids, tylenol with limited relief of the pain as well.  He has never tried epidural steroid injections in the past. He denies any bowel or bladder dysfunction/incontinence, saddle anesthesia or gait instability.     I have personally reviewed and/or updated the patient's past medical history, past surgical history, family history, social history, current medications, allergies, and vital signs today.     Review of Systems   Constitutional:  Positive for activity change.   HENT: Negative.     Eyes: Negative.    Respiratory: Negative.     Cardiovascular: Negative.    Gastrointestinal: Negative.    Endocrine: Negative.    Genitourinary: Negative.    Musculoskeletal:  Positive for arthralgias, back pain, gait problem and myalgias.   Skin: Negative.    Allergic/Immunologic: Negative.    Neurological:  Positive for weakness and numbness.   Hematological: Negative.    Psychiatric/Behavioral: Negative.     All other systems reviewed and are negative.      Patient Active Problem List   Diagnosis    Recurrent major depressive disorder, in  partial remission (HCC)    Peripheral vascular disease (HCC)    Type 2 diabetes mellitus with hemoglobin A1c goal of less than 7.0% (HCC)    Left inguinal hernia    Tobacco use    Lumbar spondylosis       Past Medical History:   Diagnosis Date    Dental disease years    Depression     Diabetes mellitus (HCC)     Dizziness weeks    Ear problems years    HL (hearing loss) months    Hypertension     Nasal congestion months    Sleep difficulties months    Tinnitus months       Past Surgical History:   Procedure Laterality Date    IR SPINE AND PAIN PROCEDURE  9/17/2024    IR SPINE AND PAIN PROCEDURE  10/22/2024    IR SPINE AND PAIN PROCEDURE  12/3/2024       Family History   Problem Relation Age of Onset    Diabetes Mother     Hypertension Mother     Hypertension Father     Heart disease Father        Social History     Occupational History    Not on file   Tobacco Use    Smoking status: Every Day     Current packs/day: 1.00     Average packs/day: 1 pack/day for 35.0 years (35.0 ttl pk-yrs)     Types: Cigarettes    Smokeless tobacco: Never   Vaping Use    Vaping status: Never Used   Substance and Sexual Activity    Alcohol use: Yes     Alcohol/week: 2.0 standard drinks of alcohol     Types: 2 Cans of beer per week    Drug use: Not Currently    Sexual activity: Not Currently       Current Outpatient Medications on File Prior to Encounter   Medication Sig    amLODIPine (NORVASC) 10 mg tablet 1 tablet every morning    Aspirin Buf,CaCarb-MgCarb-MgO, 81 MG TABS Take 1 tablet by mouth daily    atorvastatin (LIPITOR) 80 mg tablet Take 80 mg by mouth daily at bedtime    ezetimibe (ZETIA) 10 mg tablet Take 10 mg by mouth daily    fenofibrate (TRICOR) 145 mg tablet 1 tablet every morning    hydrOXYzine HCL (ATARAX) 25 mg tablet Take 0.5 tablets by mouth 2 (two) times a day as needed    losartan (COZAAR) 50 mg tablet Take 100 mg by mouth daily    Melatonin 5 MG TBDP Take 1 tablet by mouth daily at bedtime As needed    metFORMIN  (GLUCOPHAGE-XR) 500 mg 24 hr tablet 1 tablet every morning    tamsulosin (FLOMAX) 0.4 mg 1 capsule every morning    [DISCONTINUED] erythromycin (ILOTYCIN) ophthalmic ointment APPLY 1 CM IN LEFT EYE 3 TIMES A DAY (Patient not taking: Reported on 12/11/2024)    [DISCONTINUED] gabapentin (Neurontin) 600 MG tablet Take 1 tablet (600 mg total) by mouth 3 (three) times a day     No current facility-administered medications on file prior to encounter.       Allergies   Allergen Reactions    Niacin Other (See Comments)         Physical Exam    /85   Pulse 102   Temp 97.8 °F (36.6 °C) (Temporal)   Resp 20   SpO2 96%     Constitutional: normal, well developed, well nourished, alert, in no distress and non-toxic and no overt pain behavior. and overweight  Eyes: anicteric  HEENT: grossly intact  Neck: supple, symmetric, trachea midline and no masses   Pulmonary:even and unlabored  Cardiovascular:No edema or pitting edema present  Skin:Normal without rashes or lesions and well hydrated  Psychiatric:Mood and affect appropriate  Neurologic:Cranial Nerves II-XII grossly intact Sensation grossly intact; no clonus negative malhotra's. Reflexes 2+ and brisk. SLR positive right sided  Musculoskeletal:normal gait. 5/5 strength bilaterally with AROM in lower extremities. Difficulty with normal heel toe and tip toe walking. Signifiicant pain with lumbar facet loading bilaterally and with lateral spine rotation, ttp over lumbar paraspinal muscles. Negative jaja's test, negative gaenslen's negative SIJ loading bilaterally.    Imaging    MRI LUMBAR SPINE WITHOUT CONTRAST     INDICATION: M54.16: Radiculopathy, lumbar region.     COMPARISON: X-rays dated 7/11/2024     TECHNIQUE:  Multiplanar, multisequence imaging of the lumbar spine was performed. .        IMAGE QUALITY:  Diagnostic     FINDINGS:     VERTEBRAL BODIES:  There are 5 lumbar type vertebral bodies.  Normal alignment of the lumbar spine.  No spondylolysis or  spondylolisthesis. No scoliosis.  No compression fracture.    Normal marrow signal is identified within the visualized bony   structures.  No discrete marrow lesion.     SACRUM:  Normal signal within the sacrum. No evidence of insufficiency or stress fracture.     DISTAL CORD AND CONUS:  Normal size and signal within the distal cord and conus.     PARASPINAL SOFT TISSUES:  Paraspinal soft tissues are unremarkable.     LOWER THORACIC DISC SPACES:  Normal disc height and signal.  No disc herniation, canal stenosis or foraminal narrowing.     LUMBAR DISC SPACES:     L1-L2:  Normal.     L2-L3: Minimal annular bulging asymmetric towards the left without discrete disc herniation, canal stenosis or foraminal nerve impingement despite mild left-sided foraminal narrowing.     L3-L4: Mild circumferential annular bulging. Disc herniation. No canal stenosis. Mild foraminal narrowing without foraminal     L4-L5: Normal disc height. Moderate circumferential annular bulge. No focal disc herniation. Mild bilateral facet arthropathy and ligamentum flavum thickening. There is a well-circumscribed cystic structure within the right posterior lateral epidural   space medial to the facet joint measuring 1 cm 4.5 mm consistent with synovial cyst. These changes result in moderate tricompartmental canal stenosis in particular distorting the right posterior lateral aspect of the thecal sac. There is moderate left   and mild right foraminal narrowing identified.     L5-S1: Mild circumferential annular bulging and facet arthropathy. No disc herniation. No canal stenosis. Moderate bilateral foraminal narrowing with mild mass effect upon both nerves.     OTHER FINDINGS:  None.     IMPRESSION:     Number degenerative disc disease worst at the L4-5 level where there is diffuse annular bulging in addition to a 1 cm synovial cyst arising from the right facet joint resulting in moderate tricompartmental canal stenosis and moderate left foraminal    narrowing.     Additional mild canal stenosis and foraminal narrowing at L2-3 and L3-4 and moderate bilateral foraminal narrowing at L1 5 S1.

## 2025-03-18 ENCOUNTER — HOSPITAL ENCOUNTER (OUTPATIENT)
Dept: INTERVENTIONAL RADIOLOGY/VASCULAR | Facility: HOSPITAL | Age: 75
Discharge: HOME | End: 2025-03-18
Attending: ANESTHESIOLOGY
Payer: COMMERCIAL

## 2025-03-18 VITALS
WEIGHT: 200 LBS | BODY MASS INDEX: 28 KG/M2 | OXYGEN SATURATION: 98 % | HEIGHT: 71 IN | RESPIRATION RATE: 20 BRPM | DIASTOLIC BLOOD PRESSURE: 66 MMHG | SYSTOLIC BLOOD PRESSURE: 152 MMHG | HEART RATE: 93 BPM | TEMPERATURE: 97.4 F

## 2025-03-18 DIAGNOSIS — M54.16 LUMBAR RADICULOPATHY: ICD-10-CM

## 2025-03-18 LAB — GLUCOSE SERPL-MCNC: 129 MG/DL (ref 65–140)

## 2025-03-18 PROCEDURE — 82948 REAGENT STRIP/BLOOD GLUCOSE: CPT

## 2025-03-18 PROCEDURE — 64484 NJX AA&/STRD TFRM EPI L/S EA: CPT | Performed by: ANESTHESIOLOGY

## 2025-03-18 PROCEDURE — 64483 NJX AA&/STRD TFRM EPI L/S 1: CPT | Performed by: ANESTHESIOLOGY

## 2025-03-18 RX ORDER — BETAMETHASONE SODIUM PHOSPHATE AND BETAMETHASONE ACETATE 3; 3 MG/ML; MG/ML
INJECTION, SUSPENSION INTRA-ARTICULAR; INTRALESIONAL; INTRAMUSCULAR; SOFT TISSUE AS NEEDED
Status: COMPLETED | OUTPATIENT
Start: 2025-03-18 | End: 2025-03-18

## 2025-03-18 RX ORDER — LIDOCAINE HYDROCHLORIDE 10 MG/ML
INJECTION, SOLUTION EPIDURAL; INFILTRATION; INTRACAUDAL; PERINEURAL AS NEEDED
Status: COMPLETED | OUTPATIENT
Start: 2025-03-18 | End: 2025-03-18

## 2025-03-18 RX ADMIN — BETAMETHASONE SODIUM PHOSPHATE AND BETAMETHASONE ACETATE 30 MG: 3; 3 INJECTION, SUSPENSION INTRA-ARTICULAR; INTRALESIONAL; INTRAMUSCULAR at 09:48

## 2025-03-18 RX ADMIN — IOHEXOL 2 ML: 240 INJECTION, SOLUTION INTRATHECAL; INTRAVASCULAR; INTRAVENOUS; ORAL at 09:46

## 2025-03-18 RX ADMIN — LIDOCAINE HYDROCHLORIDE 10 ML: 10 INJECTION, SOLUTION EPIDURAL; INFILTRATION; INTRACAUDAL; PERINEURAL at 09:43

## 2025-03-18 NOTE — DISCHARGE INSTR - AVS FIRST PAGE
YOUR 2 WEEK FOLLOW UP HAS BEEN SCHEDULED; IF YOU WISH TO CHANGE THE FOLLOW UP, PLEASE CALL THE [facility] [place]: [phone]    EPIDURAL STEROID INJECTION DISCHARGE INSTRUCTIONS  WHAT YOU NEED TO KNOW:   An epidural steroid injection (ESTRELLITA) is a procedure to inject steroid medicine into the epidural space. The epidural space is between your spinal cord and vertebrae. Steroids reduce inflammation and fluid buildup in your spine that may be causing pain. You may be given pain medicine along with the steroids.        DISCHARGE INSTRUCTIONS:   Call your local emergency number (911 in the [place]) if:   You have a seizure.    You have trouble moving your legs.    Seek care immediately if:   Blood soaks through your bandage.    You have a fever or chills, severe back pain, and the procedure area is sensitive to the touch.    You cannot control when you urinate or have a bowel movement.    Call your doctor if:   You have weakness or numbness in your legs.    Your wound is red, swollen, or draining pus.    You have nausea or are vomiting.    Your face or neck is red and you feel warm.    You have more pain than you had before the procedure.    You have swelling in your hands or feet.    You have questions or concerns about your condition or care.    Care for your wound as directed:  You may remove the bandage before you go to bed the day of your procedure. You may take a shower, but do not take a bath for at least 24 hours.   Self-care:   Do not drive,  use machines, or do strenuous activity for 24 hours after your procedure or as directed.     Continue other treatments  as directed. Steroid injections alone will not control your pain. The injections are meant to be used with other treatments, such as physical therapy.    Follow up with your doctor as directed:  Write down your questions so you remember to ask them during your visits.           ACTIVITY  Do not drive or operate machinery today.  No strenuous  activity today - bending, lifting, etc.   You may resume normal activities starting tomorrow - start slowly and as tolerated.  You may shower today, but not tub baths or hot tubs.  You may have numbness for several hours from the local anesthetics. Please use caution and common sense, especially with weight-bearing activities.    CARE OF THE INJECTION SITE  If you have soreness or pain apply ice to the area today (20 minutes on and 20 minutes off).  Starting tomorrow, you may resume normal activities  Notify the [facility] if you have any of the following: redness, drainage, swelling or fever above 100°F.      MEDICATIONS  Continue to take all routine medications.  Our office may have instructed you to hold some medications. You may restart medications, including blood thinners.

## 2025-03-18 NOTE — INTERVAL H&P NOTE
H&P reviewed. After examining the patient discussed elevated BP and all cause mortality, risk of stroke, MI with steroid in epidural injection administration; will work with pcp to lower in coming weeks.      Vitals:    03/18/25 0914   BP: (!) 177/83   Pulse: 90   Resp: 20   Temp: 98.2 °F (36.8 °C)   SpO2: 97%

## 2025-04-01 RX ORDER — OMEGA-3 FATTY ACIDS/FISH OIL 300-1000MG
1 CAPSULE ORAL DAILY PRN
COMMUNITY

## 2025-04-02 ENCOUNTER — TELEMEDICINE (OUTPATIENT)
Dept: PAIN MEDICINE | Facility: CLINIC | Age: 75
End: 2025-04-02
Payer: COMMERCIAL

## 2025-04-02 VITALS — HEIGHT: 71 IN | WEIGHT: 200 LBS | BODY MASS INDEX: 28 KG/M2

## 2025-04-02 DIAGNOSIS — G89.4 CHRONIC PAIN SYNDROME: ICD-10-CM

## 2025-04-02 DIAGNOSIS — M48.062 SPINAL STENOSIS OF LUMBAR REGION WITH NEUROGENIC CLAUDICATION: Primary | ICD-10-CM

## 2025-04-02 PROCEDURE — 99213 OFFICE O/P EST LOW 20 MIN: CPT | Performed by: ANESTHESIOLOGY

## 2025-04-02 PROCEDURE — G2211 COMPLEX E/M VISIT ADD ON: HCPCS | Performed by: ANESTHESIOLOGY

## 2025-04-02 RX ORDER — ACETAMINOPHEN 500 MG
500 TABLET ORAL EVERY 6 HOURS PRN
COMMUNITY

## 2025-04-02 NOTE — PROGRESS NOTES
Assessment  1. Spinal stenosis of lumbar region with neurogenic claudication  -     Ambulatory referral to Neurosurgery; Future  2. Chronic pain syndrome    Greater than 90% relief of pain with improved ability to participate with IADLs after L5-S1 ILESI albeit for only 5 days before return of pain. MRI extensively reviewed with patient showing degenerative disc disease worst at the L4-5 level where there is diffuse annular bulging in addition to a 1 cm synovial cyst arising from the right facet joint resulting in moderate tricompartmental canal stenosis and moderate left foraminal narrowing. No benefit with recent right L4 and L5 TFESI; Continues to report the following symptomatology:     Right-sided lumbar radicular pain in the L4 and L5 dermatomal distribution accompanied by pain limited weakness numbness and paresthesias.  Patient has not yet participated with PT. Chronic pain with decreased participation with IADLs over the past 3 months.  Has been taking OTC ibuprofen and tylenol with modest benefit.  5/5 strength bilaterally, positive SLR right-sided. Reflexes 2+.  Additionally there is positive facet loading, R>L. Denies any gait instability, saddle anesthesia. On xray imaging Vertebral body heights are well-preserved. Disc space narrowing and osteophyte formation throughout the  lumbar spine. Mild facet hypertrophy.  Risks, benefits alternatives epidural steroid injections thoroughly discussed with patient.  Handouts provided questions answered to patient's satisfaction.  Lifestyle modifications extensively discussed including diet, exercise and weight loss in addition to core strengthening and smoking cessation.  Will proceed with multimodal pain therapy plan as noted below:    Plan  -f/u 2 days for opioid risk discussion  -referral NSGY given for progressive spinal stenosis with neurogenic claudication given failure of conservative modalities  -gabapentin increased to 600mg TID for patient; has since  tapered given sedation counseled regarding sedative effects of taking this medication and provided up titration calendar.  Counseled not to take medication while driving or operating heavy machinery/using stairs  -has completed formal physical therapy for right-sided lumbar radiculopathy; Physician directed home exercise plan as per [employer] demonstrated and handouts provided that patient plans to participate with for 1 hour, twice a week for the next 6 weeks.     There are risks associated with opioid medications, including dependence, addiction and tolerance. The patient understands and agrees to use these medications only as prescribed. Potential side effects of the medications include, but are not limited to, constipation, drowsiness, addiction, impaired judgment and risk of fatal overdose if not taken as prescribed. The patient was warned against driving while taking sedation medications or operating heavy machinery. The patient voiced understanding. Sharing medications is a felony. At this point in time, the patient is showing no signs of addiction, abuse, diversion or suicidal ideation.     [place] Prescription Drug Monitoring Program report was reviewed and was appropriate      Complete risks and benefits including bleeding, infection, tissue reaction, nerve injury and allergic reaction were discussed. The approach was demonstrated using models and literature was provided. Verbal and written consent was obtained.     My impressions and treatment recommendations were discussed in detail with the patient who verbalized understanding and had no further questions.  Discharge instructions were provided. I personally saw and examined the patient and I agree with the above discussed plan of care.    Review of external notes including PT notes, PCP notes and specialist notes was performed at this visit in addition to review of new ordered imaging and past imaging to develop or modify multidisciplinary pain  plan    New Medications Ordered This Visit   Medications    Ibuprofen 200 MG CAPS     Sig: Take 1 capsule by mouth daily as needed    acetaminophen (TYLENOL) 500 mg tablet     Sig: Take 500 mg by mouth every 6 (six) hours as needed for mild pain       History of Present Illness    Geo Cates is a 74 y.o. male with pmhx of DM-2 (NIDDM) HgbA1c 5.9%, HTN, COPD (smoker), CKD-2 presenting with chronic lumbar radicular pain in the right L4 and L5 dermatomal distributions. Debilitating pain limited weakness numbness and paresthesias accompany the pain. The patient rates the pain at a 8-9/10 accompanied by electric shock-like shooting features and crampy burning pain in the aforementioned dermatomal distributions.  The pain is worse in the mornings as well as the end of the day; exertion such as walking for long periods of time seems to exacerbate the pain.  The patient can hardly walk more than a few blocks without having debilitating pain.  He tries to maintain an active lifestyle and finds that the current degree of pain seems to compromises his efforts.  The pain significantly impacts independent activities of daily living and contributes to significant disability.  He has completed physical therapy formally and has been compliant with home PT plan.  He has taken nsaids, tylenol with limited relief of the pain as well.  He has never tried epidural steroid injections in the past. He denies any bowel or bladder dysfunction/incontinence, saddle anesthesia or gait instability.     I have personally reviewed and/or updated the patient's past medical history, past surgical history, family history, social history, current medications, allergies, and vital signs today.     Review of Systems   Constitutional:  Positive for activity change.   HENT: Negative.     Eyes: Negative.    Respiratory: Negative.     Cardiovascular: Negative.    Gastrointestinal: Negative.    Endocrine: Negative.    Genitourinary: Negative.     Musculoskeletal:  Positive for arthralgias, back pain, gait problem and myalgias.   Skin: Negative.    Allergic/Immunologic: Negative.    Neurological:  Positive for weakness and numbness.   Hematological: Negative.    Psychiatric/Behavioral: Negative.     All other systems reviewed and are negative.      Patient Active Problem List   Diagnosis    Recurrent major depressive disorder, in partial remission (HCC)    Peripheral vascular disease (HCC)    Type 2 diabetes mellitus with hemoglobin A1c goal of less than 7.0% (HCC)    Left inguinal hernia    Tobacco use    Lumbar spondylosis       Past Medical History:   Diagnosis Date    Dental disease years    Depression     Diabetes mellitus (HCC)     Dizziness weeks    Ear problems years    HL (hearing loss) months    Hypertension     Nasal congestion months    Sleep difficulties months    Tinnitus months       Past Surgical History:   Procedure Laterality Date    IR SPINE AND PAIN PROCEDURE  9/17/2024    IR SPINE AND PAIN PROCEDURE  10/22/2024    IR SPINE AND PAIN PROCEDURE  12/3/2024    IR SPINE AND PAIN PROCEDURE  3/18/2025       Family History   Problem Relation Age of Onset    Diabetes Mother     Hypertension Mother     Hypertension Father     Heart disease Father        Social History     Occupational History    Not on file   Tobacco Use    Smoking status: Every Day     Current packs/day: 1.00     Average packs/day: 1 pack/day for 35.0 years (35.0 ttl pk-yrs)     Types: Cigarettes    Smokeless tobacco: Never   Vaping Use    Vaping status: Never Used   Substance and Sexual Activity    Alcohol use: Yes     Alcohol/week: 2.0 standard drinks of alcohol     Types: 2 Cans of beer per week    Drug use: Not Currently    Sexual activity: Not Currently       Current Outpatient Medications on File Prior to Visit   Medication Sig    acetaminophen (TYLENOL) 500 mg tablet Take 500 mg by mouth every 6 (six) hours as needed for mild pain    amLODIPine (NORVASC) 10 mg tablet 1  "tablet every morning    Aspirin Buf,CaCarb-MgCarb-MgO, 81 MG TABS Take 1 tablet by mouth daily    atorvastatin (LIPITOR) 80 mg tablet Take 80 mg by mouth daily at bedtime    ezetimibe (ZETIA) 10 mg tablet Take 10 mg by mouth daily    fenofibrate (TRICOR) 145 mg tablet 1 tablet every morning    hydrOXYzine HCL (ATARAX) 25 mg tablet Take 0.5 tablets by mouth 2 (two) times a day as needed    Ibuprofen 200 MG CAPS Take 1 capsule by mouth daily as needed    losartan (COZAAR) 50 mg tablet Take 100 mg by mouth daily    Melatonin 5 MG TBDP Take 1 tablet by mouth daily at bedtime As needed    metFORMIN (GLUCOPHAGE-XR) 500 mg 24 hr tablet 1 tablet every morning    tamsulosin (FLOMAX) 0.4 mg 1 capsule every morning     No current facility-administered medications on file prior to visit.       Allergies   Allergen Reactions    Niacin Other (See Comments)         Physical Exam    Ht 5' 10.5\" (1.791 m)   Wt 90.7 kg (200 lb)   BMI 28.29 kg/m²     Constitutional: normal, well developed, well nourished, alert, in no distress and non-toxic and no overt pain behavior. and overweight  Eyes: anicteric  HEENT: grossly intact  Neck: supple, symmetric, trachea midline and no masses   Pulmonary:even and unlabored  Cardiovascular:No edema or pitting edema present  Skin:Normal without rashes or lesions and well hydrated  Psychiatric:Mood and affect appropriate  Neurologic:Cranial Nerves II-XII grossly intact Sensation grossly intact; no clonus negative malhotra's. Reflexes 2+ and brisk. SLR positive right sided  Musculoskeletal:normal gait. 5/5 strength bilaterally with AROM in lower extremities. Difficulty with normal heel toe and tip toe walking. Signifiicant pain with lumbar facet loading bilaterally and with lateral spine rotation, ttp over lumbar paraspinal muscles. Negative jaja's test, negative gaenslen's negative SIJ loading bilaterally.    Imaging    MRI LUMBAR SPINE WITHOUT CONTRAST     INDICATION: M54.16: Radiculopathy, lumbar " region.     COMPARISON: X-rays dated 7/11/2024     TECHNIQUE:  Multiplanar, multisequence imaging of the lumbar spine was performed. .        IMAGE QUALITY:  Diagnostic     FINDINGS:     VERTEBRAL BODIES:  There are 5 lumbar type vertebral bodies.  Normal alignment of the lumbar spine.  No spondylolysis or spondylolisthesis. No scoliosis.  No compression fracture.    Normal marrow signal is identified within the visualized bony   structures.  No discrete marrow lesion.     SACRUM:  Normal signal within the sacrum. No evidence of insufficiency or stress fracture.     DISTAL CORD AND CONUS:  Normal size and signal within the distal cord and conus.     PARASPINAL SOFT TISSUES:  Paraspinal soft tissues are unremarkable.     LOWER THORACIC DISC SPACES:  Normal disc height and signal.  No disc herniation, canal stenosis or foraminal narrowing.     LUMBAR DISC SPACES:     L1-L2:  Normal.     L2-L3: Minimal annular bulging asymmetric towards the left without discrete disc herniation, canal stenosis or foraminal nerve impingement despite mild left-sided foraminal narrowing.     L3-L4: Mild circumferential annular bulging. Disc herniation. No canal stenosis. Mild foraminal narrowing without foraminal     L4-L5: Normal disc height. Moderate circumferential annular bulge. No focal disc herniation. Mild bilateral facet arthropathy and ligamentum flavum thickening. There is a well-circumscribed cystic structure within the right posterior lateral epidural   space medial to the facet joint measuring 1 cm 4.5 mm consistent with synovial cyst. These changes result in moderate tricompartmental canal stenosis in particular distorting the right posterior lateral aspect of the thecal sac. There is moderate left   and mild right foraminal narrowing identified.     L5-S1: Mild circumferential annular bulging and facet arthropathy. No disc herniation. No canal stenosis. Moderate bilateral foraminal narrowing with mild mass effect upon both  nerves.     OTHER FINDINGS:  None.     IMPRESSION:     Number degenerative disc disease worst at the L4-5 level where there is diffuse annular bulging in addition to a 1 cm synovial cyst arising from the right facet joint resulting in moderate tricompartmental canal stenosis and moderate left foraminal   narrowing.     Additional mild canal stenosis and foraminal narrowing at L2-3 and L3-4 and moderate bilateral foraminal narrowing at L1 5 S1.       Virtual Brief Visit  Name: Geo Cates      : 1950      MRN: [id number]  Encounter Provider: Rolf Diaz MD  Encounter Date: 2025   Encounter department: [facility]  :  Assessment & Plan  Spinal stenosis of lumbar region with neurogenic claudication    Orders:    Ambulatory referral to Neurosurgery; Future    Chronic pain syndrome             History of Present Illness   HPI    Administrative Statements   Encounter provider Rolf Diaz MD    The Patient is located at Home and in the following state in which I hold an active license [place].    The patient was identified by name and date of birth. Geo Cates was informed that this is a telemedicine visit and that the visit is being conducted through Telephone.  My office door was closed. No one else was in the room.  He acknowledged consent and understanding of privacy and security of the video platform. The patient has agreed to participate and understands they can discontinue the visit at any time.    I have spent a total time of 15 minutes in caring for this patient on the day of the visit/encounter including Diagnostic results, Prognosis, Risks and benefits of tx options, Instructions for management, Patient and family education, Importance of tx compliance, Impressions, Counseling / Coordination of care, Documenting in the medical record, Reviewing/placing orders in the medical record (including tests, medications, and/or procedures), and Obtaining or  reviewing history  and communicating with other health care providers/schedulers, not including the time spent for establishing the audio/video connection.

## 2025-04-02 NOTE — PATIENT INSTRUCTIONS
"Patient Education     Back exercises   The Basics   Written by the doctors and editors at [facility], [place]   Why do I need to do back exercises? -- Back exercises can help ease back pain and might help prevent future back pain. Long term, it is important to strengthen the muscles in your lower back, buttocks, and belly. These are your \"core muscles.\" Stretching exercises are also important to keep your muscles flexible.  Below are some stretching and strengthening exercises that might help you. Other forms of movement can help ease or prevent back pain, too. For example, some people like to walk, do aerobic exercise, or do yoga or william chi. The most important thing is to move your body. Your doctor, nurse, or physical therapist can help you find different types of activity that work for you.  What stretching exercises should I do? -- Below are some examples of stretching exercises. Warm up your muscles before stretching to help prevent injury. To warm up, you can walk, jog, or cycle for a few minutes.  Start by repeating each of these stretches 2 to 3 times. Try to hold each stretch for 5 to 10 seconds, and try to do the stretches 2 to 3 times each day. Breathe slowly and deeply as you do the exercises. Never bounce when doing stretches.   Cat-cow stretch (figure 1) - Start on all fours on the floor, with your hands under your shoulders, knees under your hips, and your back flat. First, tuck your chin and tighten your stomach muscles as you round your back (like a \"cat\"). Hold the stretch for about 10 seconds. Rest for a few seconds as you flatten your back. Next, lift your chin and let your belly and lower back sink toward the floor (like a \"cow\"). Hold the stretch for about 10 seconds.   Single knee-to-chest stretches (figure 2) ? While lying on your back, bend your knees with your feet flat on the floor. Pull 1 knee toward your chest until you feel a stretch in your lower back and buttock area. Lower, and repeat with the " other knee. If you have knee problems, pull your knee up by grabbing the back of your thigh instead of the front of your knee. You can also do this exercise by grabbing both knees at the same time.   Lower trunk rotations (figure 3) ? While lying on your back, bend your knees with your feet flat on the floor. Keep your knees and ankles together, and then drop them to 1 side. Keep both of your shoulders touching the floor until you feel a stretch in the muscles at the side of the back. Repeat on the other side.   Lower back stretches seated (figure 4) ? Sit in a chair with your feet spread about shoulder width apart. Then, lean forward until you feel a stretch in your lower back.  What strengthening exercises should I do? -- Below are some examples of strengthening exercises.  Start by doing each exercise 2 to 3 times. Work up to doing each exercise 10 times. Hold each exercise for 3 to 5 seconds, and try to do the exercises 2 to 3 times each day. Do all exercises slowly.   Shoulder blade squeezes (figure 5) ? Pinch your shoulder blades together on your upper back, and hold 3 to 5 seconds. You can also do these 1 side at a time. Sit with good posture, and make sure that your shoulders do not rise up when you do this exercise. Relax.   Pelvic tilts (figure 6) ? Lie on your back with your knees bent and feet flat on the floor. Tighten your stomach muscles, and press your lower back down to the floor. Relax. You should be able to breathe comfortably during this exercise.   Hip lifts (figure 7) ? Lie on your back with your knees bent and feet flat on the floor. Tighten your stomach muscles, keep your back flat, and lift your buttocks off of the floor. Relax. You should feel this in your buttocks, not in your lower back.  What else should I know?    Exercise, including stretching, might be slightly uncomfortable. But you should not have sharp or severe pain. If you do get severe pain, stop what you are doing. If severe  "pain continues, call your doctor or nurse.   Do not hold your breath when exercising. If you tend to hold your breath, try counting out loud when exercising. If any exercise bothers you, stop right away.   Always warm up before exercising. Warm muscles stretch much easier than cool muscles. Stretching cool muscles can lead to injury.   Doing exercises before a meal can be a good way to get into a routine.  All topics are updated as new evidence becomes available and our peer review process is complete.  This topic retrieved from [employer] on: Apr 03, 2024.  Topic 500426 Version 2.0  Release: 32.2.4 - C32.92  © 2024 [employer], Inc. and/or its affiliates. All rights reserved.  figure 1: Cat-cow stretch     Start on all fours on the floor, with hands under your shoulders, knees under your hips, and your back flat. First, tuck your chin and tighten your stomach muscles as you round your back (like a \"cat\"). Hold the stretch for about 10 seconds. Rest for a few seconds as you flatten your back. Next, lift your chin and let your belly and lower back sink toward the floor (like a \"cow\"). Hold the stretch for about 10 seconds.  Graphic [id number] Version 1.0  figure 2: Single knee-to-chest stretch     Lie on your back, bend your knees, and have your feet flat on the floor. Pull 1 knee toward your chest until you feel a stretch in your lower back and buttock area. Repeat with the other knee. If you have knee problems, pull your knee up by grabbing the back of your thigh instead of the front of your knee. You can also do this exercise by grabbing both knees at the same time.  Graphic [id number] Version 1.0  figure 3: Lower trunk rotation     While lying on your back, bend your knees and have your feet flat on the floor. Keep your legs together and then drop them to 1 side. Keep both of your shoulders touching the floor until you feel a stretch in the muscles at the side of the back. Repeat on the other side.  Graphic [id number] Version " 1.0  figure 4: Lower back stretch     Sit in a chair with your feet spread about shoulder width apart. Then, lean forward until you feel a stretch in your lower back.  Graphic [id number] Version 1.0  figure 5: Shoulder blade squeezes     Pinch your shoulder blades together on your upper back and hold for 3 to 5 seconds. Make sure that you are sitting with good posture and that your shoulders do not raise up when you do this exercise. Relax.  Graphic [id number] Version 1.0  figure 6: Pelvic tilts     Lie on your back with your knees bent and feet flat on the floor. Tighten your stomach muscles and press your lower back down to the floor. Relax.  Graphic [id number] Version 1.0  figure 7: Hip lifts     Lie on your back with your knees bent and feet flat on the floor. Tighten your stomach muscles and lift your buttocks off of the floor. Relax.  Graphic [id number] Version 1.0  Consumer Information Use and Disclaimer   Disclaimer: This generalized information is a limited summary of diagnosis, treatment, and/or medication information. It is not meant to be comprehensive and should be used as a tool to help the user understand and/or assess potential diagnostic and treatment options. It does NOT include all information about conditions, treatments, medications, side effects, or risks that may apply to a specific patient. It is not intended to be medical advice or a substitute for the medical advice, diagnosis, or treatment of a health care provider based on the health care provider's examination and assessment of a patient's specific and unique circumstances. Patients must speak with a health care provider for complete information about their health, medical questions, and treatment options, including any risks or benefits regarding use of medications. This information does not endorse any treatments or medications as safe, effective, or approved for treating a specific patient. Up[employer], [employer] and its affiliates disclaim any warranty or  liability relating to this information or the use thereof.The use of this information is governed by the Terms of Use, available at https://www.wolterskluwer.com/en/know/clinical-effectiveness-terms. 2024© UpToDate, Inc. and its affiliates and/or licensors. All rights reserved.  Copyright   © 2024 [employer] and/or its affiliates. All rights reserved.

## 2025-04-03 ENCOUNTER — TELEPHONE (OUTPATIENT)
Age: 75
End: 2025-04-03

## 2025-06-02 ENCOUNTER — OFFICE VISIT (OUTPATIENT)
Dept: URGENT CARE | Facility: CLINIC | Age: 75
End: 2025-06-02
Payer: COMMERCIAL

## 2025-06-02 ENCOUNTER — APPOINTMENT (OUTPATIENT)
Dept: RADIOLOGY | Facility: CLINIC | Age: 75
End: 2025-06-02
Payer: COMMERCIAL

## 2025-06-02 VITALS
TEMPERATURE: 98.4 F | RESPIRATION RATE: 18 BRPM | HEART RATE: 94 BPM | DIASTOLIC BLOOD PRESSURE: 67 MMHG | HEIGHT: 70 IN | WEIGHT: 183.2 LBS | SYSTOLIC BLOOD PRESSURE: 151 MMHG | BODY MASS INDEX: 26.23 KG/M2 | OXYGEN SATURATION: 96 %

## 2025-06-02 DIAGNOSIS — J40 BRONCHITIS: Primary | ICD-10-CM

## 2025-06-02 DIAGNOSIS — R05.1 ACUTE COUGH: ICD-10-CM

## 2025-06-02 PROCEDURE — 96372 THER/PROPH/DIAG INJ SC/IM: CPT

## 2025-06-02 PROCEDURE — 71046 X-RAY EXAM CHEST 2 VIEWS: CPT

## 2025-06-02 PROCEDURE — 99213 OFFICE O/P EST LOW 20 MIN: CPT

## 2025-06-02 PROCEDURE — S9083 URGENT CARE CENTER GLOBAL: HCPCS

## 2025-06-02 RX ORDER — ALBUTEROL SULFATE 90 UG/1
2 INHALANT RESPIRATORY (INHALATION) EVERY 6 HOURS PRN
Qty: 8.5 G | Refills: 0 | Status: SHIPPED | OUTPATIENT
Start: 2025-06-02

## 2025-06-02 RX ORDER — LISINOPRIL 30 MG/1
30 TABLET ORAL DAILY
COMMUNITY
Start: 2025-05-19

## 2025-06-02 RX ORDER — PREDNISONE 20 MG/1
60 TABLET ORAL DAILY
Qty: 12 TABLET | Refills: 0 | Status: SHIPPED | OUTPATIENT
Start: 2025-06-03 | End: 2025-06-07

## 2025-06-02 RX ORDER — AZITHROMYCIN 250 MG/1
TABLET, FILM COATED ORAL
Qty: 6 TABLET | Refills: 0 | Status: SHIPPED | OUTPATIENT
Start: 2025-06-02 | End: 2025-06-06

## 2025-06-02 RX ORDER — METHYLPREDNISOLONE SODIUM SUCCINATE 125 MG/2ML
60 INJECTION, POWDER, LYOPHILIZED, FOR SOLUTION INTRAMUSCULAR; INTRAVENOUS ONCE
Status: COMPLETED | OUTPATIENT
Start: 2025-06-02 | End: 2025-06-02

## 2025-06-02 RX ADMIN — METHYLPREDNISOLONE SODIUM SUCCINATE 60 MG: 125 INJECTION, POWDER, LYOPHILIZED, FOR SOLUTION INTRAMUSCULAR; INTRAVENOUS at 10:38

## 2025-06-02 NOTE — PROGRESS NOTES
[facility]        NAME: Geo Cates is a 74 y.o. male  : 1950    MRN: [id number]  DATE: 2025  TIME: 12:00 PM    Assessment and Plan   Bronchitis [J40]  1. Bronchitis  XR chest pa and lateral    methylPREDNISolone sodium succinate (Solu-MEDROL) injection 60 mg    predniSONE 20 mg tablet    albuterol (ProAir HFA) 90 mcg/act inhaler    azithromycin (ZITHROMAX) 250 mg tablet        Cxr negative for pneumonia  Symptoms consistent with bronchitis.  Pt requesting shot in office. Solumedrol ordered.  Will send prednisone for additional 4 days and azithromycin given pt smoking hx.     Patient Instructions     Steroid shot in office.  Start oral steroid (prednisone) on 6/3/2025  Start azithromycin on 2025.  Albuterol inhaler 2 puffs every 4-6 hours as needed for shortness of breath.   Follow up with PCP in 3-5 days.  Proceed to  ER if symptoms worsen.    Chief Complaint     Chief Complaint   Patient presents with    Nasal Congestion    Shortness of Breath    Cough     Pt c/o upper resp symptoms or 1 week. Pt states he is a smoker and has been having some SOB with exertion for the last few days. Pt states last week he had diarrhea that is now resolved.          History of Present Illness       Patient is a 74 year old male who presents to the office today for continued cold symptoms. Reports started with diarrhea after eating pizza Saturday last week. Has some chest congestion, cough, rhinorrhea, sob. Concerned for pneumonia. Usually smokes about 1 pk/day but recently had to cut down s/t illness.         Review of Systems   Review of Systems   Constitutional:  Negative for chills and fever.   HENT:  Positive for congestion and rhinorrhea.    Respiratory:  Positive for shortness of breath and wheezing.    Gastrointestinal:  Positive for diarrhea (resolved).   All other systems reviewed and are negative.        Current Medications     Current Medications[1]    Current Allergies     Allergies as of  "06/02/2025 - Reviewed 06/02/2025   Allergen Reaction Noted    Niacin Other (See Comments) 11/26/2019            The following portions of the patient's history were reviewed and updated as appropriate: allergies, current medications, past family history, past medical history, past social history, past surgical history and problem list.     Past Medical History[2]    Past Surgical History[3]    Family History[4]      Medications have been verified.        Objective   /67   Pulse 94   Temp 98.4 °F (36.9 °C)   Resp 18   Ht 5' 10\" (1.778 m)   Wt 83.1 kg (183 lb 3.2 oz)   SpO2 96%   BMI 26.29 kg/m²        Physical Exam     Physical Exam  Vitals and nursing note reviewed.   Constitutional:       Appearance: He is well-developed and normal weight.     Cardiovascular:      Rate and Rhythm: Normal rate and regular rhythm.   Pulmonary:      Effort: Pulmonary effort is normal.      Breath sounds: Examination of the right-upper field reveals decreased breath sounds. Examination of the left-upper field reveals decreased breath sounds. Examination of the right-middle field reveals decreased breath sounds. Examination of the right-lower field reveals decreased breath sounds. Examination of the left-lower field reveals decreased breath sounds. Decreased breath sounds present.     Skin:     General: Skin is warm.      Capillary Refill: Capillary refill takes less than 2 seconds.     Neurological:      Mental Status: He is alert.                        [1]   Current Outpatient Medications:     acetaminophen (TYLENOL) 500 mg tablet, Take 500 mg by mouth every 6 (six) hours as needed for mild pain, Disp: , Rfl:     albuterol (ProAir HFA) 90 mcg/act inhaler, Inhale 2 puffs every 6 (six) hours as needed for wheezing or shortness of breath, Disp: 8.5 g, Rfl: 0    amLODIPine (NORVASC) 10 mg tablet, 1 tablet every morning, Disp: , Rfl:     Aspirin Buf,CaCarb-MgCarb-MgO, 81 MG TABS, Take 1 tablet by mouth in the morning., Disp: " , Rfl:     atorvastatin (LIPITOR) 80 mg tablet, Take 80 mg by mouth daily at bedtime, Disp: , Rfl:     azithromycin (ZITHROMAX) 250 mg tablet, Take 2 tablets today then 1 tablet daily x 4 days, Disp: 6 tablet, Rfl: 0    ezetimibe (ZETIA) 10 mg tablet, Take 10 mg by mouth in the morning., Disp: , Rfl:     fenofibrate (TRICOR) 145 mg tablet, 1 tablet every morning, Disp: , Rfl:     hydrOXYzine HCL (ATARAX) 25 mg tablet, Take 0.5 tablets by mouth as needed in the morning and 0.5 tablets as needed in the evening., Disp: , Rfl:     Ibuprofen 200 MG CAPS, Take 1 capsule by mouth daily as needed, Disp: , Rfl:     lisinopril (ZESTRIL) 30 mg tablet, Take 30 mg by mouth daily, Disp: , Rfl:     Melatonin 5 MG TBDP, Take 1 tablet by mouth daily at bedtime As needed, Disp: , Rfl:     metFORMIN (GLUCOPHAGE-XR) 500 mg 24 hr tablet, 1 tablet every morning, Disp: , Rfl:     [START ON 6/3/2025] predniSONE 20 mg tablet, Take 3 tablets (60 mg total) by mouth daily for 4 days Do not start before Thea 3, 2025., Disp: 12 tablet, Rfl: 0    tamsulosin (FLOMAX) 0.4 mg, 1 capsule every morning, Disp: , Rfl:   No current facility-administered medications for this visit.  [2]   Past Medical History:  Diagnosis Date    Dental disease years    Depression     Diabetes mellitus (HCC)     Dizziness weeks    Ear problems years    HL (hearing loss) months    Hypertension     Nasal congestion months    Sleep difficulties months    Tinnitus months   [3]   Past Surgical History:  Procedure Laterality Date    IR SPINE AND PAIN PROCEDURE  9/17/2024    IR SPINE AND PAIN PROCEDURE  10/22/2024    IR SPINE AND PAIN PROCEDURE  12/3/2024    IR SPINE AND PAIN PROCEDURE  3/18/2025   [4]   Family History  Problem Relation Name Age of Onset    Diabetes Mother Marimar     Hypertension Mother Marimar     Hypertension Father Geo     Heart disease Father Geo

## 2025-06-02 NOTE — PATIENT INSTRUCTIONS
Steroid shot in office.  Start oral steroid (prednisone) on 6/3/2025  Start azithromycin on 6/2/2025.  Albuterol inhaler 2 puffs every 4-6 hours as needed for shortness of breath.